# Patient Record
Sex: FEMALE | Race: WHITE | NOT HISPANIC OR LATINO | URBAN - METROPOLITAN AREA
[De-identification: names, ages, dates, MRNs, and addresses within clinical notes are randomized per-mention and may not be internally consistent; named-entity substitution may affect disease eponyms.]

---

## 2019-03-30 ENCOUNTER — EMERGENCY (EMERGENCY)
Facility: HOSPITAL | Age: 69
LOS: 1 days | Discharge: HOME | End: 2019-03-30
Attending: EMERGENCY MEDICINE
Payer: COMMERCIAL

## 2019-03-30 VITALS
RESPIRATION RATE: 18 BRPM | HEART RATE: 73 BPM | WEIGHT: 147.93 LBS | DIASTOLIC BLOOD PRESSURE: 97 MMHG | HEIGHT: 61 IN | TEMPERATURE: 99 F | SYSTOLIC BLOOD PRESSURE: 152 MMHG | OXYGEN SATURATION: 100 %

## 2019-03-30 VITALS
HEART RATE: 68 BPM | OXYGEN SATURATION: 99 % | SYSTOLIC BLOOD PRESSURE: 142 MMHG | TEMPERATURE: 99 F | RESPIRATION RATE: 18 BRPM | DIASTOLIC BLOOD PRESSURE: 81 MMHG

## 2019-03-30 PROCEDURE — 99283 EMERGENCY DEPT VISIT LOW MDM: CPT | Mod: 25

## 2019-03-30 PROCEDURE — 29125 APPL SHORT ARM SPLINT STATIC: CPT | Mod: LT

## 2019-03-30 PROCEDURE — 73630 X-RAY EXAM OF FOOT: CPT | Mod: 26,LT

## 2019-03-30 PROCEDURE — 99284 EMERGENCY DEPT VISIT MOD MDM: CPT | Mod: 25

## 2019-03-30 PROCEDURE — 73610 X-RAY EXAM OF ANKLE: CPT

## 2019-03-30 PROCEDURE — 73630 X-RAY EXAM OF FOOT: CPT

## 2019-03-30 PROCEDURE — 73610 X-RAY EXAM OF ANKLE: CPT | Mod: 26,LT

## 2019-03-30 PROCEDURE — 29515 APPLICATION SHORT LEG SPLINT: CPT

## 2019-03-30 RX ORDER — IBUPROFEN 200 MG
600 TABLET ORAL ONCE
Qty: 0 | Refills: 0 | Status: COMPLETED | OUTPATIENT
Start: 2019-03-30 | End: 2019-03-30

## 2019-03-30 RX ORDER — ACETAMINOPHEN 500 MG
975 TABLET ORAL ONCE
Qty: 0 | Refills: 0 | Status: COMPLETED | OUTPATIENT
Start: 2019-03-30 | End: 2019-03-30

## 2019-03-30 RX ORDER — TRAMADOL HYDROCHLORIDE 50 MG/1
50 TABLET ORAL ONCE
Qty: 0 | Refills: 0 | Status: DISCONTINUED | OUTPATIENT
Start: 2019-03-30 | End: 2019-03-30

## 2019-03-30 RX ADMIN — TRAMADOL HYDROCHLORIDE 50 MILLIGRAM(S): 50 TABLET ORAL at 21:42

## 2019-03-30 RX ADMIN — Medication 975 MILLIGRAM(S): at 21:41

## 2019-03-30 RX ADMIN — Medication 600 MILLIGRAM(S): at 21:42

## 2019-03-30 NOTE — ED ADULT NURSE NOTE - NSIMPLEMENTINTERV_GEN_ALL_ED
Implemented All Fall Risk Interventions:  [place] to call system. Call bell, personal items and telephone within reach. Instruct patient to call for assistance. Room bathroom lighting operational. Non-slip footwear when patient is off stretcher. Physically safe environment: no spills, clutter or unnecessary equipment. Stretcher in lowest position, wheels locked, appropriate side rails in place. Provide visual cue, wrist band, yellow gown, etc. Monitor gait and stability. Monitor for mental status changes and reorient to person, place, and time. Review medications for side effects contributing to fall risk. Reinforce activity limits and safety measures with patient and family.

## 2019-03-30 NOTE — ED PROVIDER NOTE - PROGRESS NOTE DETAILS
Plain films reviewed. Patient informed our interpretation is preliminary. Splint applied and crutches provided regardless. Discharged with appropriate instruction and follow-up.

## 2019-03-30 NOTE — ED ADULT TRIAGE NOTE - RESPIRATORY RATE (BREATHS/MIN)
Discharge Instructions    Discharged to home by taxi with self. Discharged via wheelchair, hospital escort: Yes.  Special equipment needed: Not Applicable    Be sure to schedule a follow-up appointment with your primary care doctor or any specialists as instructed.     Discharge Plan:   Diet Plan: Discussed  Activity Level: Discussed  Smoking Cessation Offered: Patient Counseled  Confirmed Follow up Appointment: Patient to Call and Schedule Appointment  Confirmed Symptoms Management: Discussed  Medication Reconciliation Updated: Yes  Pneumococcal Vaccine Administered/Refused: Given (See MAR)  Influenza Vaccine Indication: Indicated: 9 to 64 years of age  Influenza Vaccine Given - only chart on this line when given: Influenza Vaccine Given (See MAR)    I understand that a diet low in cholesterol, fat, and sodium is recommended for good health. Unless I have been given specific instructions below for another diet, I accept this instruction as my diet prescription.   Other diet: regular    Special Instructions: None    · Is patient discharged on Warfarin / Coumadin?   No     Depression / Suicide Risk    As you are discharged from this [facility], it is important to learn how to keep safe from harming yourself.    Recognize the warning signs:  · Abrupt changes in personality, positive or negative- including increase in energy   · Giving away possessions  · Change in eating patterns- significant weight changes-  positive or negative  · Change in sleeping patterns- unable to sleep or sleeping all the time   · Unwillingness or inability to communicate  · Depression  · Unusual sadness, discouragement and loneliness  · Talk of wanting to die  · Neglect of personal appearance   · Rebelliousness- reckless behavior  · Withdrawal from people/activities they love  · Confusion- inability to concentrate     If you or a loved one observes any of these behaviors or has concerns about self-harm, here's what you can  do:  · Talk about it- your feelings and reasons for harming yourself  · Remove any means that you might use to hurt yourself (examples: pills, rope, extension cords, firearm)  · Get professional help from the community (Mental Health, Substance Abuse, psychological counseling)  · Do not be alone:Call your Safe Contact- someone whom you trust who will be there for you.  · Call your local CRISIS HOTLINE 950-1411 or 066-680-4453  · Call your local Children's Mobile Crisis Response Team [place] (802) 866-3005 or www.People and Pages  · Call the toll free National Suicide Prevention Hotlines   · National Suicide Prevention Lifeline 305-537-CFRS (1484)  · National Hope Line Network 800-SUICIDE (528-4243)           18

## 2019-03-30 NOTE — ED PROVIDER NOTE - ATTENDING CONTRIBUTION TO CARE
67 y/o female with the above documented history and HPI who on exam appears well and comfortable. VSs noted, neck supple, breathing c/ ease, extremities c/ slight swelling and ttp on the dorsal-lateral  aspect of her L foot s/ ttp at the proximal L fibula, posterior to her L malleoli or over the L navicular, skin s/ rash and neurovascularly intact. Appropriate imaging ordered. Analgesia provided. Will follow her imaging and treat/dispo accordingly.

## 2019-03-30 NOTE — ED ADULT NURSE NOTE - OBJECTIVE STATEMENT
69 y/o female with PMH of HTN presents to the ED  from home c/o left foot injury. Patient states that she was walking down the stairs around 1500 this afternoon and had missed the last step and had landed on the outer side of her foot. patient states she has had pain and has not been able to walk. Denies fever, chills, n/v, weakness, abd pain, diarrhea/constipation, numbness/tingling, urinary s/s. Patient A&Ox3, in no respiratory distress, and denies chest pain. Patient has sensation in all 4 extremities. Strong peripheral pulses.

## 2019-03-30 NOTE — ED PROVIDER NOTE - PHYSICAL EXAMINATION
NAD, VSS, Afebrile, + PERRL. No facial or scalp tender. No spinal tender. NO rib or CVA tender. Lungs clear. ABD soft, non tender. No pelvic or hip tender. + Left ankle generalized tender, diminished ROMs, without obvious swelling. + Left foot mild swelling/ tender to metatarsal area. N/V- intact.

## 2019-03-30 NOTE — ED PROVIDER NOTE - CONSTITUTIONAL, MLM
normal... Well appearing, well nourished, awake, alert, oriented to person, place, time/situation and in no apparent distress. no

## 2019-03-30 NOTE — ED PROVIDER NOTE - OBJECTIVE STATEMENT
67yo female pt, no PMHx, c/o left foot/ ankle pain s/p mechanical fall this afternoon. Pt missed the last step when she came down stairs and fell hurting left ankle at 3pm. Denies LOC or other injuries. Pt stated the pain was mild initially and she was able to walk. The pain's worsen with severe pain after a long period of sitting. Now she is unable to weight bear on left foot. Denies sensory changes or weakness to extremities. Denies headache, dizziness or N/V. Denies neck or back pain. Denies CP/SOB/ABD pain. Denies fever, chills or recent sickness.

## 2019-03-31 RX ORDER — TRAMADOL HYDROCHLORIDE 50 MG/1
1 TABLET ORAL
Qty: 20 | Refills: 0
Start: 2019-03-31

## 2019-04-01 ENCOUNTER — APPOINTMENT (OUTPATIENT)
Dept: ORTHOPEDIC SURGERY | Facility: CLINIC | Age: 69
End: 2019-04-01
Payer: COMMERCIAL

## 2019-04-01 VITALS — BODY MASS INDEX: 28.13 KG/M2 | WEIGHT: 149 LBS | HEIGHT: 61 IN

## 2019-04-01 DIAGNOSIS — S93.402A SPRAIN OF UNSPECIFIED LIGAMENT OF LEFT ANKLE, INITIAL ENCOUNTER: ICD-10-CM

## 2019-04-01 PROCEDURE — 99203 OFFICE O/P NEW LOW 30 MIN: CPT

## 2019-04-01 NOTE — HISTORY OF PRESENT ILLNESS
[7] : an average pain level of 7/10 [de-identified] : Pt is a 69 y/o female c/o left ankle pain x 2 days.  She hurt her ankle when she tripped going down stairs.  She did not fall but states that she twisted the ankle. Patient was able to weight bear immediately after but states that the pain has worsened since. She had pain and swelling in the ankle.  She went [facility] ER that evening where xrays were taken and read negative for fx. She was placed in a posterior splint and a cast shoe.  She was told to follow up here.  The pain is improving. Patient localizes the pain about the ankle and over the lateral aspect of the foot dorsally. She was given something for pain but she cannot recall the name of the medication.  She is able to ambulate without assistance.

## 2019-04-01 NOTE — CONSULT LETTER
[Dear  ___] : Dear  [unfilled], [Consult Letter:] : I had the pleasure of evaluating your patient, [unfilled]. [Please see my note below.] : Please see my note below. [Sincerely,] : Sincerely, [FreeTextEntry2] : GHADA MEEKS [FreeTextEntry3] : Dr. Robin Paula

## 2019-04-01 NOTE — ADDENDUM
[FreeTextEntry1] : I, Jen Hu wrote this note acting as a scribe for Dr. Robin Paula on Apr 01, 2019.

## 2019-04-01 NOTE — DISCUSSION/SUMMARY
[de-identified] : The underlying pathophysiology was reviewed with the patient. Treatment options were discussed including; observation, NSAIDS, analgesics, bracing, and physical therapy. \par \par Patient may wear an ASO ankle brace. \par Walking and physical activity were encouraged. \par NSAIDs as tolerated. \par Follow up in 4 weeks for reevaluation.

## 2019-04-01 NOTE — END OF VISIT
[FreeTextEntry3] : I, Robin Paula MD, ordering physician, have read and attest that all the information, medical decision making and discharge instructions within are true and accurate.

## 2019-04-01 NOTE — PHYSICAL EXAM
[de-identified] : Patient is WDWN, alert, and in no acute distress. Breathing is unlabored. She is grossly oriented to person, place, and time. \par left foot\par \par LEFT Foot: There is tenderness to palpation dorsally over the left anterior talofibular ligament. Active and passive ROM of the ankle is full. There is pain with inversion. There is mild edema. Muscle strength is 5/5, NV intact. Skin is warm to touch. pulses palpated. The joint is stable with stress maneuvers. There is no pain with palpation over the calcaneus.  [de-identified] : Xrays of the left ankle were obtained from an outside medical facility and revealed no evidence of fracture or dislocation.

## 2019-10-23 ENCOUNTER — RESULT REVIEW (OUTPATIENT)
Age: 69
End: 2019-10-23

## 2020-07-02 ENCOUNTER — INPATIENT (INPATIENT)
Facility: HOSPITAL | Age: 70
LOS: 0 days | Discharge: LEFT BEFORE DISCHARGE | DRG: 312 | End: 2020-07-03
Attending: INTERNAL MEDICINE | Admitting: INTERNAL MEDICINE
Payer: MEDICARE

## 2020-07-02 VITALS
OXYGEN SATURATION: 100 % | WEIGHT: 149.03 LBS | HEART RATE: 71 BPM | SYSTOLIC BLOOD PRESSURE: 167 MMHG | HEIGHT: 61 IN | TEMPERATURE: 98 F | RESPIRATION RATE: 16 BRPM | DIASTOLIC BLOOD PRESSURE: 97 MMHG

## 2020-07-02 DIAGNOSIS — R07.9 CHEST PAIN, UNSPECIFIED: ICD-10-CM

## 2020-07-02 LAB
ALBUMIN SERPL ELPH-MCNC: 4.5 G/DL — SIGNIFICANT CHANGE UP (ref 3.3–5)
ALP SERPL-CCNC: 60 U/L — SIGNIFICANT CHANGE UP (ref 40–120)
ALT FLD-CCNC: 18 U/L — SIGNIFICANT CHANGE UP (ref 10–45)
ANION GAP SERPL CALC-SCNC: 12 MMOL/L — SIGNIFICANT CHANGE UP (ref 5–17)
AST SERPL-CCNC: 22 U/L — SIGNIFICANT CHANGE UP (ref 10–40)
BILIRUB SERPL-MCNC: 0.2 MG/DL — SIGNIFICANT CHANGE UP (ref 0.2–1.2)
BUN SERPL-MCNC: 13 MG/DL — SIGNIFICANT CHANGE UP (ref 7–23)
CALCIUM SERPL-MCNC: 9.8 MG/DL — SIGNIFICANT CHANGE UP (ref 8.4–10.5)
CHLORIDE SERPL-SCNC: 104 MMOL/L — SIGNIFICANT CHANGE UP (ref 96–108)
CO2 SERPL-SCNC: 27 MMOL/L — SIGNIFICANT CHANGE UP (ref 22–31)
CREAT SERPL-MCNC: 0.84 MG/DL — SIGNIFICANT CHANGE UP (ref 0.5–1.3)
GLUCOSE SERPL-MCNC: 99 MG/DL — SIGNIFICANT CHANGE UP (ref 70–99)
HCT VFR BLD CALC: 44.8 % — SIGNIFICANT CHANGE UP (ref 34.5–45)
HGB BLD-MCNC: 15 G/DL — SIGNIFICANT CHANGE UP (ref 11.5–15.5)
MCHC RBC-ENTMCNC: 29.1 PG — SIGNIFICANT CHANGE UP (ref 27–34)
MCHC RBC-ENTMCNC: 33.5 GM/DL — SIGNIFICANT CHANGE UP (ref 32–36)
MCV RBC AUTO: 87 FL — SIGNIFICANT CHANGE UP (ref 80–100)
NRBC # BLD: 0 /100 WBCS — SIGNIFICANT CHANGE UP (ref 0–0)
PLATELET # BLD AUTO: 220 K/UL — SIGNIFICANT CHANGE UP (ref 150–400)
POTASSIUM SERPL-MCNC: 4.5 MMOL/L — SIGNIFICANT CHANGE UP (ref 3.5–5.3)
POTASSIUM SERPL-SCNC: 4.5 MMOL/L — SIGNIFICANT CHANGE UP (ref 3.5–5.3)
PROT SERPL-MCNC: 7 G/DL — SIGNIFICANT CHANGE UP (ref 6–8.3)
RBC # BLD: 5.15 M/UL — SIGNIFICANT CHANGE UP (ref 3.8–5.2)
RBC # FLD: 12.9 % — SIGNIFICANT CHANGE UP (ref 10.3–14.5)
SODIUM SERPL-SCNC: 143 MMOL/L — SIGNIFICANT CHANGE UP (ref 135–145)
TROPONIN T, HIGH SENSITIVITY RESULT: <6 NG/L — SIGNIFICANT CHANGE UP (ref 0–51)
WBC # BLD: 6.05 K/UL — SIGNIFICANT CHANGE UP (ref 3.8–10.5)
WBC # FLD AUTO: 6.05 K/UL — SIGNIFICANT CHANGE UP (ref 3.8–10.5)

## 2020-07-02 PROCEDURE — 99223 1ST HOSP IP/OBS HIGH 75: CPT

## 2020-07-02 PROCEDURE — 70450 CT HEAD/BRAIN W/O DYE: CPT | Mod: 26

## 2020-07-02 PROCEDURE — 99285 EMERGENCY DEPT VISIT HI MDM: CPT | Mod: CS,GC

## 2020-07-02 PROCEDURE — 93010 ELECTROCARDIOGRAM REPORT: CPT | Mod: GC

## 2020-07-02 PROCEDURE — 71046 X-RAY EXAM CHEST 2 VIEWS: CPT | Mod: 26

## 2020-07-02 RX ORDER — ASPIRIN/CALCIUM CARB/MAGNESIUM 324 MG
324 TABLET ORAL ONCE
Refills: 0 | Status: COMPLETED | OUTPATIENT
Start: 2020-07-02 | End: 2020-07-02

## 2020-07-02 RX ADMIN — Medication 324 MILLIGRAM(S): at 22:39

## 2020-07-02 NOTE — H&P ADULT - PROBLEM SELECTOR PLAN 3
increase norvasc to 5mg daily  give stat hydralazine 10mg IV now increase norvasc to 5mg daily  BP very labile, ranging from -203; spontaneously decreased to .   will monitor closely for now

## 2020-07-02 NOTE — ED PROVIDER NOTE - ATTENDING CONTRIBUTION TO CARE
Private Physician Michelle Laureano [phone] PCP Deshaun Funetes  70y female pmh HTN, No dm,hld,cancer,cva,cad/mi,habits, Pt comes to ed complains of 3wk of "confusion in my head, with room spinning" worse with turning head. Pt had normal carotid doppler and schecuede mri brain. Dizziness had improved. Now with addition of shortness of breath and chest pressure, weakness. Pain/presssure comes and goes.  pusle 58-65 b/p 100 to 140 systolic. Pt made appointment to [facility]. PE WDWN female awake alert. PE WDWN female awake alert normocephalic atraumatic neck supple chest clear anterior & posterior abd soft +bs no mass guarding cv no rmg neruo no focal defect  Adams Menjivar MD, Facep

## 2020-07-02 NOTE — ED PROVIDER NOTE - NS_ ATTENDINGSCRIBEDETAILS _ED_A_ED_FT
History and Physical performed by me with scribe under my direct supervision.  ROSSY Menjivar MD FACEP

## 2020-07-02 NOTE — H&P ADULT - NSHPREVIEWOFSYSTEMS_GEN_ALL_CORE
CONSTITUTIONAL: No weakness, fevers or chills  EYES/ENT: No visual changes;  No dysphagia  NECK: No pain or stiffness  RESPIRATORY: +shortness of breath  CARDIOVASCULAR: chest pressure  EXTREMITIES: no le edema, cyanosis, clubbing  MUSCULOSKELETAL: no joint pain, swelling  GASTROINTESTINAL: No abdominal or epigastric pain. No nausea, vomiting, or hematemesis; No diarrhea or constipation. No melena or hematochezia.  BACK: +back pain radiating to left lower extremity  GENITOURINARY: No dysuria, frequency or hematuria  NEUROLOGICAL: +dizziness  SKIN: No itching, burning, rashes, or lesions   PSYCH: no agitation  All other review of systems is negative unless indicated above.

## 2020-07-02 NOTE — H&P ADULT - NSHPLABSRESULTS_GEN_ALL_CORE
Labs personally reviewed:                          15.0   6.05  )-----------( 220      ( 02 Jul 2020 23:03 )             44.8     07-02    143  |  104  |  13  ----------------------------<  99  4.5   |  27  |  0.84    Ca    9.8      02 Jul 2020 23:03    TPro  7.0  /  Alb  4.5  /  TBili  0.2  /  DBili  x   /  AST  22  /  ALT  18  /  AlkPhos  60  07-02        LIVER FUNCTIONS - ( 02 Jul 2020 23:03 )  Alb: 4.5 g/dL / Pro: 7.0 g/dL / ALK PHOS: 60 U/L / ALT: 18 U/L / AST: 22 U/L / GGT: x               CAPILLARY BLOOD GLUCOSE          Imaging:  CXR personally reviewed: no focal opacity  CT head reviewed:  There is no CT evidence of acute intracranial hemorrhage, extra-axial collection, vasogenic edema, mass effect, midline shift, central herniation, or hydrocephalus.   The visualized paranasal sinuses are clear. The mastoid air cells and middle ear cavities are clear.  The soft tissues of the scalp are unremarkable. The calvarium is intact.  IMPRESSION:   No CT evidence of acute intracranial hemorrhage, brain edema, mass effect or acute territorial infarct.     EKG personally reviewed: nsr, no acute st changes

## 2020-07-02 NOTE — H&P ADULT - NSHPPHYSICALEXAM_GEN_ALL_CORE
PHYSICAL EXAM:  Vital Signs Last 24 Hrs  T(C): 36.6 (07-03-20 @ 00:15)  T(F): 97.8 (07-03-20 @ 00:15), Max: 98.6 (07-02-20 @ 23:36)  HR: 67 (07-03-20 @ 00:15) (66 - 71)  BP: 165/92 (07-03-20 @ 00:15)  BP(mean): --  RR: 18 (07-03-20 @ 00:15) (16 - 22)  SpO2: 100% (07-03-20 @ 00:15) (99% - 100%)  Wt(kg): --    Constitutional: NAD, awake and alert  EYES: EOMI  ENT:  Normal Hearing, no tonsillar exudates   Neck: Soft and supple, No JVD  Lungs: Breath sounds are clear bilaterally, No wheezing, rales or rhonchi  Heart: S1 and S2, regular rate and rhythm, no Murmurs, gallops or rubs  Abdomen: Bowel Sounds present, soft, nontender, nondistended, no guarding, no rebound  Extremities: No cyanosis or clubbing; warm to touch  Vascular: 2+ peripheral pulses lower ex  Neurological: A/O x 3, no focal deficits; no nystagmus  Musculoskeletal: 5/5 strength b/l upper and lower extremities  Skin: No rashes  Psych: no depression or anhedonia  HEME: no bruises, no nose bleeds

## 2020-07-02 NOTE — H&P ADULT - HISTORY OF PRESENT ILLNESS
yo female with PMH of HTN, seasonal allergies, presents here with dizziness and SOB.  Patient states that about 3 weeks ago she started feeling "room spinning".  She denies any changes in vision or hearing, but she had tinnitus which was present for years apparently.  She says every time she moves her head, she feels room is spinning.  She spoke to her PCP who sent her for Carotid duplex that was negative.  She was ordered for MRI w/contrast which was not yet done.  Three days ago she woke up with SOB and feeling of heaviness in the chest.  Symptoms were mild, but constant.  She was scared to go for her regular morning walk because her symptoms were getting worse.  Today she work up with worsening of SOB and heaviness in the chest as well as "spinning" feeling.  She thought she was going to faint.  She checked her BP, which was very labile ranging from systolic of 140s-160s.  She denies any fever, chills, nausea, vomiting, cough, abdominal pain, GI/ symptoms. 69 yo female with PMH of HTN, seasonal allergies, presents here with dizziness and SOB.  Patient states that about 3 weeks ago she started feeling "room spinning".  She denies any changes in vision or hearing, but she had tinnitus which was present for years apparently.  She says every time she moves her head, she feels room is spinning.  She spoke to her PCP who sent her for Carotid duplex that was negative.  She was ordered for MRI w/contrast which was not yet done.  Three days ago she woke up with SOB and feeling of heaviness in the chest.  Symptoms were mild, but constant.  She was scared to go for her regular morning walk because her symptoms were getting worse.  Today she woke up with worsening of SOB and heaviness in the chest as well as "spinning" feeling.  Chest pain/heaviness does not change with breathing or movement.  Denies any associated diaphoresis.  She thought she was going to faint.  She checked her BP, which was very labile ranging from systolic of 140s-160s.  She denies any fever, chills, nausea, vomiting, cough, abdominal pain, GI/ symptoms. 71 yo female with PMH of HTN, seasonal allergies, presents here with dizziness and SOB.  Patient states that about 3 weeks ago she started feeling "room spinning".  She denies any changes in vision or hearing, but she had tinnitus which was present for years apparently.  She says every time she moves her head, she feels room is spinning.  She spoke to her PCP who sent her for Carotid duplex that was negative.  She was ordered for MRI w/contrast which was not yet done.  Three days ago she woke up with SOB and feeling of heaviness in the chest.  Symptoms were mild, but constant.  She was scared to go for her regular morning walk because her symptoms were getting worse.  Today she woke up with worsening of SOB and heaviness in the chest as well as "spinning" feeling.  Chest pain/heaviness does not change with breathing or movement.  Denies any associated diaphoresis.  She thought she was going to faint.  She checked her BP, which was very labile ranging from systolic of 140s-160s.  She denies any fever, chills, nausea, vomiting, cough, abdominal pain, GI/ symptoms.  In ED, patient continued to have dizziness.  SBP ranged from 150-203.

## 2020-07-02 NOTE — ED PROVIDER NOTE - OBJECTIVE STATEMENT
70y F w/ PMHx HTN p/w SOB, CP. Pt reports she first noticed sx occurring intermittently 3 weeks ago a/w dizziness. She visited her PMD and had carotid US with no noted abnormalities and planned for MRI. Today upon waking, CP was more severe, described as right sided pressure with SOB. She checked her blood pressure and noticed it was very labile despite taking HTN medication. She called her PMD who made an appointment with cardiology for Wednesday, but came to the ED today due to the persistent CP. Pt is currently without CP.

## 2020-07-02 NOTE — H&P ADULT - ATTENDING COMMENTS
Patient assigned to me by night hospitalist in charge for management and care for patient for this evening only. Care to be resumed by day hospitalist (Dr. Morales) in the morning and thereafter.     Patient's care rendered on 7/2/20 at 11PM.      Plan discussed with Patient, RN, daughter, [place].

## 2020-07-02 NOTE — H&P ADULT - PROBLEM SELECTOR PLAN 2
atypical chest pain/heaviness; r/o ACS  ?anxiety  EKG no ST changes  trend cardiac enzymes  monitor on telemetry  check echo  check TSH, A1C, Lipid panel, TSH

## 2020-07-02 NOTE — ED ADULT NURSE NOTE - OBJECTIVE STATEMENT
70F to ED c/o chest pressure and SOB for 3 weeks, worse today. Pt also c/o dizziness to the back of her head, as per patient. Pt has had negative carotid duplexes recently. Pt states recent SOB and chest heaviness and feeling like she might pass out. Pt has hx of HTN. Pt appears tachypnic at this time. Pt BS are CTA. Pt has 20 ga IV to RAC placed in ED. Pt is placed on cardiac monitor, NSR. Pt is alert and oriented x4, calm/cooperative, NAD, VSS. Pt speaks english and Hebrew. Pt is given the call bell, and verbalizes understanding of its use, and patient is given blanket for comfort.

## 2020-07-02 NOTE — H&P ADULT - PROBLEM SELECTOR PLAN 1
Patient with dizziness, feeling of faint, describing "room spinning", denies any hearing impairment, but has tinnitus; no fever, chills  likely vertigo, ?meniere's   r/o other causes such as ACS vs. PE vs. valvular disease vs. arrythmia vs. uncontrolled BP  will monitor on telemetry  CT head reviewed, no acute abnormalities  trend cardiac enzymes  check d-dimer  check echocardiogram  start trial of meclizine  need better control of BP, increase norvasc to 5mg for now  check TSH

## 2020-07-02 NOTE — ED PROVIDER NOTE - CLINICAL SUMMARY MEDICAL DECISION MAKING FREE TEXT BOX
Chucho Aguilera (MD): 70y F w/ hx of HTN p/w CP, SOB. ACS W/U. Will also obtain CT head for dizziness. If w/u normal, will have pt follow up with outpatient.

## 2020-07-03 ENCOUNTER — TRANSCRIPTION ENCOUNTER (OUTPATIENT)
Age: 70
End: 2020-07-03

## 2020-07-03 VITALS
OXYGEN SATURATION: 98 % | SYSTOLIC BLOOD PRESSURE: 149 MMHG | TEMPERATURE: 98 F | RESPIRATION RATE: 18 BRPM | HEART RATE: 87 BPM | DIASTOLIC BLOOD PRESSURE: 87 MMHG

## 2020-07-03 DIAGNOSIS — R07.89 OTHER CHEST PAIN: ICD-10-CM

## 2020-07-03 DIAGNOSIS — R09.89 OTHER SPECIFIED SYMPTOMS AND SIGNS INVOLVING THE CIRCULATORY AND RESPIRATORY SYSTEMS: ICD-10-CM

## 2020-07-03 DIAGNOSIS — I16.0 HYPERTENSIVE URGENCY: ICD-10-CM

## 2020-07-03 DIAGNOSIS — R42 DIZZINESS AND GIDDINESS: ICD-10-CM

## 2020-07-03 DIAGNOSIS — Z90.710 ACQUIRED ABSENCE OF BOTH CERVIX AND UTERUS: Chronic | ICD-10-CM

## 2020-07-03 DIAGNOSIS — Z29.9 ENCOUNTER FOR PROPHYLACTIC MEASURES, UNSPECIFIED: ICD-10-CM

## 2020-07-03 LAB
A1C WITH ESTIMATED AVERAGE GLUCOSE RESULT: 5.4 % — SIGNIFICANT CHANGE UP (ref 4–5.6)
ALBUMIN SERPL ELPH-MCNC: 4.3 G/DL — SIGNIFICANT CHANGE UP (ref 3.3–5)
ALP SERPL-CCNC: 60 U/L — SIGNIFICANT CHANGE UP (ref 40–120)
ALT FLD-CCNC: 17 U/L — SIGNIFICANT CHANGE UP (ref 10–45)
ANION GAP SERPL CALC-SCNC: 10 MMOL/L — SIGNIFICANT CHANGE UP (ref 5–17)
AST SERPL-CCNC: 21 U/L — SIGNIFICANT CHANGE UP (ref 10–40)
BASOPHILS # BLD AUTO: 0.01 K/UL — SIGNIFICANT CHANGE UP (ref 0–0.2)
BASOPHILS NFR BLD AUTO: 0.2 % — SIGNIFICANT CHANGE UP (ref 0–2)
BILIRUB SERPL-MCNC: 0.4 MG/DL — SIGNIFICANT CHANGE UP (ref 0.2–1.2)
BUN SERPL-MCNC: 11 MG/DL — SIGNIFICANT CHANGE UP (ref 7–23)
CALCIUM SERPL-MCNC: 9.6 MG/DL — SIGNIFICANT CHANGE UP (ref 8.4–10.5)
CHLORIDE SERPL-SCNC: 105 MMOL/L — SIGNIFICANT CHANGE UP (ref 96–108)
CHOLEST SERPL-MCNC: 220 MG/DL — HIGH (ref 10–199)
CK MB BLD-MCNC: 1.6 % — SIGNIFICANT CHANGE UP (ref 0–3.5)
CK MB CFR SERPL CALC: 1.7 NG/ML — SIGNIFICANT CHANGE UP (ref 0–3.8)
CK SERPL-CCNC: 106 U/L — SIGNIFICANT CHANGE UP (ref 25–170)
CO2 SERPL-SCNC: 27 MMOL/L — SIGNIFICANT CHANGE UP (ref 22–31)
CREAT SERPL-MCNC: 0.78 MG/DL — SIGNIFICANT CHANGE UP (ref 0.5–1.3)
D DIMER BLD IA.RAPID-MCNC: <150 NG/ML DDU — SIGNIFICANT CHANGE UP
EOSINOPHIL # BLD AUTO: 0.08 K/UL — SIGNIFICANT CHANGE UP (ref 0–0.5)
EOSINOPHIL NFR BLD AUTO: 1.5 % — SIGNIFICANT CHANGE UP (ref 0–6)
ESTIMATED AVERAGE GLUCOSE: 108 MG/DL — SIGNIFICANT CHANGE UP (ref 68–114)
GLUCOSE SERPL-MCNC: 101 MG/DL — HIGH (ref 70–99)
HCT VFR BLD CALC: 44.5 % — SIGNIFICANT CHANGE UP (ref 34.5–45)
HCV AB S/CO SERPL IA: 0.09 S/CO — SIGNIFICANT CHANGE UP (ref 0–0.99)
HCV AB SERPL-IMP: SIGNIFICANT CHANGE UP
HDLC SERPL-MCNC: 74 MG/DL — SIGNIFICANT CHANGE UP
HGB BLD-MCNC: 15.3 G/DL — SIGNIFICANT CHANGE UP (ref 11.5–15.5)
IMM GRANULOCYTES NFR BLD AUTO: 0.4 % — SIGNIFICANT CHANGE UP (ref 0–1.5)
LIPID PNL WITH DIRECT LDL SERPL: 125 MG/DL — HIGH
LYMPHOCYTES # BLD AUTO: 1.61 K/UL — SIGNIFICANT CHANGE UP (ref 1–3.3)
LYMPHOCYTES # BLD AUTO: 30.6 % — SIGNIFICANT CHANGE UP (ref 13–44)
MAGNESIUM SERPL-MCNC: 2.1 MG/DL — SIGNIFICANT CHANGE UP (ref 1.6–2.6)
MCHC RBC-ENTMCNC: 29.7 PG — SIGNIFICANT CHANGE UP (ref 27–34)
MCHC RBC-ENTMCNC: 34.4 GM/DL — SIGNIFICANT CHANGE UP (ref 32–36)
MCV RBC AUTO: 86.4 FL — SIGNIFICANT CHANGE UP (ref 80–100)
MONOCYTES # BLD AUTO: 0.56 K/UL — SIGNIFICANT CHANGE UP (ref 0–0.9)
MONOCYTES NFR BLD AUTO: 10.6 % — SIGNIFICANT CHANGE UP (ref 2–14)
NEUTROPHILS # BLD AUTO: 2.98 K/UL — SIGNIFICANT CHANGE UP (ref 1.8–7.4)
NEUTROPHILS NFR BLD AUTO: 56.7 % — SIGNIFICANT CHANGE UP (ref 43–77)
NRBC # BLD: 0 /100 WBCS — SIGNIFICANT CHANGE UP (ref 0–0)
PHOSPHATE SERPL-MCNC: 3.8 MG/DL — SIGNIFICANT CHANGE UP (ref 2.5–4.5)
PLATELET # BLD AUTO: 201 K/UL — SIGNIFICANT CHANGE UP (ref 150–400)
POTASSIUM SERPL-MCNC: 4.1 MMOL/L — SIGNIFICANT CHANGE UP (ref 3.5–5.3)
POTASSIUM SERPL-SCNC: 4.1 MMOL/L — SIGNIFICANT CHANGE UP (ref 3.5–5.3)
PROT SERPL-MCNC: 6.7 G/DL — SIGNIFICANT CHANGE UP (ref 6–8.3)
RBC # BLD: 5.15 M/UL — SIGNIFICANT CHANGE UP (ref 3.8–5.2)
RBC # FLD: 13 % — SIGNIFICANT CHANGE UP (ref 10.3–14.5)
SARS-COV-2 IGG SERPL QL IA: NEGATIVE — SIGNIFICANT CHANGE UP
SARS-COV-2 IGM SERPL IA-ACNC: <3.8 AU/ML — SIGNIFICANT CHANGE UP
SARS-COV-2 RNA SPEC QL NAA+PROBE: SIGNIFICANT CHANGE UP
SODIUM SERPL-SCNC: 142 MMOL/L — SIGNIFICANT CHANGE UP (ref 135–145)
TOTAL CHOLESTEROL/HDL RATIO MEASUREMENT: 3 RATIO — LOW (ref 3.3–7.1)
TRIGL SERPL-MCNC: 106 MG/DL — SIGNIFICANT CHANGE UP (ref 10–149)
TROPONIN T, HIGH SENSITIVITY RESULT: 6 NG/L — SIGNIFICANT CHANGE UP (ref 0–51)
TSH SERPL-MCNC: 4.35 UIU/ML — HIGH (ref 0.27–4.2)
WBC # BLD: 5.26 K/UL — SIGNIFICANT CHANGE UP (ref 3.8–10.5)
WBC # FLD AUTO: 5.26 K/UL — SIGNIFICANT CHANGE UP (ref 3.8–10.5)

## 2020-07-03 PROCEDURE — 99232 SBSQ HOSP IP/OBS MODERATE 35: CPT

## 2020-07-03 PROCEDURE — 82550 ASSAY OF CK (CPK): CPT

## 2020-07-03 PROCEDURE — 70450 CT HEAD/BRAIN W/O DYE: CPT

## 2020-07-03 PROCEDURE — 71046 X-RAY EXAM CHEST 2 VIEWS: CPT

## 2020-07-03 PROCEDURE — 84443 ASSAY THYROID STIM HORMONE: CPT

## 2020-07-03 PROCEDURE — 80053 COMPREHEN METABOLIC PANEL: CPT

## 2020-07-03 PROCEDURE — 85379 FIBRIN DEGRADATION QUANT: CPT

## 2020-07-03 PROCEDURE — 84100 ASSAY OF PHOSPHORUS: CPT

## 2020-07-03 PROCEDURE — 86803 HEPATITIS C AB TEST: CPT

## 2020-07-03 PROCEDURE — 93005 ELECTROCARDIOGRAM TRACING: CPT

## 2020-07-03 PROCEDURE — 83735 ASSAY OF MAGNESIUM: CPT

## 2020-07-03 PROCEDURE — 86769 SARS-COV-2 COVID-19 ANTIBODY: CPT

## 2020-07-03 PROCEDURE — 93306 TTE W/DOPPLER COMPLETE: CPT | Mod: 26

## 2020-07-03 PROCEDURE — 80061 LIPID PANEL: CPT

## 2020-07-03 PROCEDURE — 83036 HEMOGLOBIN GLYCOSYLATED A1C: CPT

## 2020-07-03 PROCEDURE — 93306 TTE W/DOPPLER COMPLETE: CPT

## 2020-07-03 PROCEDURE — 84484 ASSAY OF TROPONIN QUANT: CPT

## 2020-07-03 PROCEDURE — 82553 CREATINE MB FRACTION: CPT

## 2020-07-03 PROCEDURE — 85027 COMPLETE CBC AUTOMATED: CPT

## 2020-07-03 PROCEDURE — 99285 EMERGENCY DEPT VISIT HI MDM: CPT | Mod: 25

## 2020-07-03 RX ORDER — CHOLECALCIFEROL (VITAMIN D3) 125 MCG
2000 CAPSULE ORAL DAILY
Refills: 0 | Status: DISCONTINUED | OUTPATIENT
Start: 2020-07-03 | End: 2020-07-03

## 2020-07-03 RX ORDER — AMLODIPINE BESYLATE 2.5 MG/1
1 TABLET ORAL
Qty: 0 | Refills: 0 | DISCHARGE

## 2020-07-03 RX ORDER — MECLIZINE HCL 12.5 MG
1 TABLET ORAL
Qty: 42 | Refills: 0
Start: 2020-07-03 | End: 2020-07-16

## 2020-07-03 RX ORDER — SODIUM CHLORIDE 9 MG/ML
1000 INJECTION INTRAMUSCULAR; INTRAVENOUS; SUBCUTANEOUS
Refills: 0 | Status: DISCONTINUED | OUTPATIENT
Start: 2020-07-03 | End: 2020-07-03

## 2020-07-03 RX ORDER — CHOLECALCIFEROL (VITAMIN D3) 125 MCG
1 CAPSULE ORAL
Qty: 0 | Refills: 0 | DISCHARGE

## 2020-07-03 RX ORDER — AMLODIPINE BESYLATE 2.5 MG/1
5 TABLET ORAL DAILY
Refills: 0 | Status: DISCONTINUED | OUTPATIENT
Start: 2020-07-03 | End: 2020-07-03

## 2020-07-03 RX ORDER — HEPARIN SODIUM 5000 [USP'U]/ML
5000 INJECTION INTRAVENOUS; SUBCUTANEOUS EVERY 12 HOURS
Refills: 0 | Status: DISCONTINUED | OUTPATIENT
Start: 2020-07-03 | End: 2020-07-03

## 2020-07-03 RX ORDER — MECLIZINE HCL 12.5 MG
25 TABLET ORAL EVERY 8 HOURS
Refills: 0 | Status: DISCONTINUED | OUTPATIENT
Start: 2020-07-03 | End: 2020-07-03

## 2020-07-03 RX ADMIN — SODIUM CHLORIDE 70 MILLILITER(S): 9 INJECTION INTRAMUSCULAR; INTRAVENOUS; SUBCUTANEOUS at 10:06

## 2020-07-03 RX ADMIN — Medication 2000 UNIT(S): at 08:58

## 2020-07-03 RX ADMIN — AMLODIPINE BESYLATE 5 MILLIGRAM(S): 2.5 TABLET ORAL at 06:03

## 2020-07-03 NOTE — DISCHARGE NOTE NURSING/CASE MANAGEMENT/SOCIAL WORK - PATIENT PORTAL LINK FT
You can access the FollowMyHealth Patient Portal offered by [facility] by registering at the following website: [email or web address]. By joining [facility]’s FollowMyHealth portal, you will also be able to view your health information using other applications (apps) compatible with our system.

## 2020-07-03 NOTE — DISCHARGE NOTE PROVIDER - HOSPITAL COURSE
69 yo female with PMH of HTN, seasonal allergies, presents here with dizziness and SOB.  Patient states that about 3 weeks ago she started feeling "room spinning".  She denies any changes in vision or hearing, but she had tinnitus which was present for years apparently.  She says every time she moves her head, she feels room is spinning.  She spoke to her PCP who sent her for Carotid duplex that was negative.  She was ordered for MRI w/ contrast which was not yet done. Three days ago she woke up with SOB and feeling of heaviness in the chest.  Symptoms were mild, but constant.  She was scared to go for her regular morning walk because her symptoms were getting worse. Today she woke up with worsening of SOB and heaviness in the chest as well as "spinning" feeling.  Chest pain/heaviness does not change with breathing or movement.  Denies any associated diaphoresis.  She thought she was going to faint.  She checked her BP, which was very labile ranging from systolic of 140s-160s.  She denies any fever, chills, nausea, vomiting, cough, abdominal pain, GI/ symptoms.  In ED, patient continued to have dizziness, started on meclizine. During hospital course also found to have orthostatic hypotension, started on IV fluids.        Patient is now leaving the hospital against medical advice (AMA). After thorough discussion regarding risks of leaving AMA including bodily harm and even death, patient has verbalized understanding and leaving now without completing the recommended diagnostic/treatment regimen. 69 yo female PMH of HTN, seasonal allergies, presents here with dizziness and SOB.  Patient states that about 3 weeks ago she started feeling "room spinning".  She denies any changes in vision or hearing, but she had tinnitus which was present for years apparently.  She says every time she moves her head, she feels room is spinning.  She spoke to her PCP who sent her for Carotid duplex that was negative.  She was ordered for MRI w/ contrast which was not yet done. Three days ago she woke up with SOB and feeling of heaviness in the chest.  Symptoms were mild, but constant.  She was scared to go for her regular morning walk because her symptoms were getting worse. Today she woke up with worsening of SOB and heaviness in the chest as well as "spinning" feeling.  Chest pain/heaviness does not change with breathing or movement.  Denies any associated diaphoresis.  She thought she was going to faint.  She checked her BP, which was very labile ranging from systolic of 140s-160s.  In ED, patient continued to have dizziness, started on meclizine. During hospital course also found to have orthostatic hypotension, started on IV fluids.        Labs basically all normal. A1C 5.4, CBC and SMA-7 all normal, CT head normal, TTE bascially normal EF, see copy of report. COVID-19 nasal swab was negative.    Troponins all normal.           Patient is now leaving the hospital against medical advice (AMA). After thorough discussion regarding risks of leaving AMA including bodily harm and even death, patient has verbalized understanding and leaving now without completing the recommended diagnostic/treatment regimen. See attached med list. 69 yo female PMH of HTN, seasonal allergies, presents here with dizziness and SOB.  Patient states that about 3 weeks ago she started feeling "room spinning".  She denies any changes in vision or hearing, but she had tinnitus which was present for years apparently.  She says every time she moves her head, she feels room is spinning.  She spoke to her PCP who sent her for Carotid duplex that was negative.  She was ordered for MRI w/ contrast which was not yet done. Three days ago she woke up with SOB and feeling of heaviness in the chest.  Symptoms were mild, but constant.  She was scared to go for her regular morning walk because her symptoms were getting worse. Today she woke up with worsening of SOB and heaviness in the chest as well as "spinning" feeling.  Chest pain/heaviness does not change with breathing or movement.  Denies any associated diaphoresis.  She thought she was going to faint.  She checked her BP, which was very labile ranging from systolic of 140s-160s.  In ED, patient continued to have dizziness, started on meclizine. During hospital course also found to have orthostatic hypotension, started on IV fluids.        Labs basically all normal. A1C 5.4, CBC and SMA-7 all normal, CT head normal, TTE bascially normal EF, see copy of report. COVID-19 nasal swab was negative.    Troponins all normal.  EKG is NSR, no ST changes.          Patient is now leaving the hospital against medical advice (AMA). After thorough discussion regarding risks of leaving AMA including bodily harm and even death, patient has verbalized understanding and leaving now without completing the recommended diagnostic/treatment regimen. See attached med list.

## 2020-07-03 NOTE — PROGRESS NOTE ADULT - ASSESSMENT
71 yo female   with PMH of HTN, seasonal allergies,   presents here with dizziness and SOB.     tele,  nsr  normal  d  dimer  dizziness/  Ct head,  normal  s/p  cp/ card eval  HTn , on norvasc   e cho 69 yo female   with PMH of HTN, seasonal allergies,   presents here with dizziness   and SOB./  s/p  cp ,  for  past  2 years  per  pt    tele,  nsr  normal  d  dimer  dizziness/  Ct head,  normal  s/p  cp/ card eval  HTn , on norvasc  5mg. qd   pt is  orthostatic/  with  drop  of  20  mm hg  on iv  fluids/    dizziness  is  probably  from  dehydration/ orthostasis  will  not  increase  norvasc   echo,  pending

## 2020-07-03 NOTE — PROGRESS NOTE ADULT - SUBJECTIVE AND OBJECTIVE BOX
no  cp/spb    REVIEW OF SYSTEMS:  GEN: no fever,    no chills  RESP: no SOB,   no cough  CVS: no chest pain,   no palpitations  GI: no abdominal pain,   no nausea,   no vomiting,   no constipation,   no diarrhea  : no dysuria,   no frequency  NEURO: no headache,   no dizziness  PSYCH: no depression,   not anxious  Derm : no rash    MEDICATIONS  (STANDING):  amLODIPine   Tablet 5 milliGRAM(s) Oral daily  cholecalciferol 2000 Unit(s) Oral daily    MEDICATIONS  (PRN):  meclizine 25 milliGRAM(s) Oral every 8 hours PRN Dizziness      Vital Signs Last 24 Hrs  T(C): 36.6 (03 Jul 2020 04:30), Max: 37 (02 Jul 2020 23:36)  T(F): 97.9 (03 Jul 2020 04:30), Max: 98.6 (02 Jul 2020 23:36)  HR: 65 (03 Jul 2020 04:30) (65 - 71)  BP: 144/76 (03 Jul 2020 04:30) (144/76 - 167/97)  BP(mean): --  RR: 18 (03 Jul 2020 04:30) (16 - 22)  SpO2: 98% (03 Jul 2020 04:30) (98% - 100%)  CAPILLARY BLOOD GLUCOSE        I&O's Summary      PHYSICAL EXAM:  HEAD:  Atraumatic, Normocephalic  NECK: Supple, No   JVD  CHEST/LUNG:   no     rales,     no,    rhonchi  HEART: Regular rate and rhythm;         murmur  ABDOMEN: Soft, Nontender, ;   EXTREMITIES:     no   edema  NEUROLOGY:  alert    LABS:                        15.3   5.26  )-----------( 201      ( 03 Jul 2020 06:19 )             44.5     07-03    142  |  105  |  11  ----------------------------<  101<H>  4.1   |  27  |  0.78    Ca    9.6      03 Jul 2020 06:19  Phos  3.8     07-03  Mg     2.1     07-03    TPro  6.7  /  Alb  4.3  /  TBili  0.4  /  DBili  x   /  AST  21  /  ALT  17  /  AlkPhos  60  07-03      CARDIAC MARKERS ( 03 Jul 2020 01:04 )  x     / x     / 106 U/L / x     / 1.7 ng/mL                        Consultant(s) Notes Reviewed:      Care Discussed with Consultants/Other Providers:

## 2020-07-03 NOTE — PROVIDER CONTACT NOTE (OTHER) - SITUATION
Laying 156/93, HR 82 - patient states feeling "dizzy"  Sitting 134/87, HR 85 - patient states feeling "very dizzy"  Standing 134/96,  -patient states feeling SOB and c/o chest pressure  O2 98%
Patient c/o 4/10 chest pressure and dizziness on ambulation

## 2020-07-03 NOTE — CONSULT NOTE ADULT - ATTENDING COMMENTS
Patient seen and examined.  Agree with above NP note.  patient with vertigo symptoms, recent chest pain, now with dyspnea   +orthostatics  seems like BPV with imposed orthostatic hypotension  cont meclizine  hydrate  await echo   rec nuclear stress testing with new onset dyspnea, chest pain  she does not want to stay for inpt workup and wants to leave

## 2020-07-03 NOTE — DISCHARGE NOTE PROVIDER - NSDCCPCAREPLAN_GEN_ALL_CORE_FT
PRINCIPAL DISCHARGE DIAGNOSIS  Diagnosis: Dizziness  Assessment and Plan of Treatment: Found to have orthostatic hypotension, started on IV fluids.  CV stable, EKG with no evidence of acute ischemia or arrythmia.  CE neg x 2, tele without events  Continue meclizine as needed for vertigo   CT head - no acute abnormalities  check echo to eval LVEF, valve function  Follow up with your PCP upon discharge.        SECONDARY DISCHARGE DIAGNOSES  Diagnosis: SOB (shortness of breath)  Assessment and Plan of Treatment: Follow up with your Cardiologist and PCP upon discharge.

## 2020-07-03 NOTE — CONSULT NOTE ADULT - SUBJECTIVE AND OBJECTIVE BOX
CARDIOLOGY CONSULT - Dr. Atkinson     CHIEF COMPLAINT: sob, dizziness     HPI:  69 yo female with PMH of HTN, seasonal allergies, presents here with dizziness and SOB.  Patient states that about 3 weeks ago she started feeling "room spinning".  She denies any changes in vision or hearing, but she had tinnitus which was present for years apparently.  She says every time she moves her head, she feels room is spinning.  She spoke to her PCP who sent her for Carotid duplex that was negative.  She was ordered for MRI w/ contrast which was not yet done. Three days ago she woke up with SOB and feeling of heaviness in the chest.  Symptoms were mild, but constant.  She was scared to go for her regular morning walk because her symptoms were getting worse. Today she woke up with worsening of SOB and heaviness in the chest as well as "spinning" feeling.  Chest pain/heaviness does not change with breathing or movement.  Denies any associated diaphoresis.  She thought she was going to faint.  She checked her BP, which was very labile ranging from systolic of 140s-160s.  She denies any fever, chills, nausea, vomiting, cough, abdominal pain, GI/ symptoms.  In ED, patient continued to have dizziness.  SBP ranged from 150-203.       PAST MEDICAL & SURGICAL HISTORY:  Seasonal allergies  HTN (hypertension)  S/P hysterectomy          PREVIOUS DIAGNOSTIC TESTING:    [ ] Echocardiogram:   [ ]  Catheterization:   [ ] Stress Test:  	    MEDICATIONS:  MEDICATIONS  (STANDING):  amLODIPine   Tablet 5 milliGRAM(s) Oral daily  cholecalciferol 2000 Unit(s) Oral daily  heparin   Injectable 5000 Unit(s) SubCutaneous every 12 hours      FAMILY HISTORY:  No pertinent family history      SOCIAL HISTORY:    [ x] Non-smoker  [ ] Smoker  [ ] Alcohol    Allergies    No Known Allergies    Intolerances    	    REVIEW OF SYSTEMS:  CONSTITUTIONAL: No fever, weight loss, or fatigue  EYES: No eye pain, visual disturbances, or discharge  ENMT:  No difficulty hearing, tinnitus, vertigo; No sinus or throat pain  NECK: No pain or stiffness  RESPIRATORY: No cough, wheezing, chills or hemoptysis; No Shortness of Breath  CARDIOVASCULAR: No chest pain, palpitations, passing out, +dizziness, or leg swelling  GASTROINTESTINAL: No abdominal or epigastric pain. No nausea, vomiting, or hematemesis; No diarrhea or constipation. No melena or hematochezia.  GENITOURINARY: No dysuria, frequency, hematuria, or incontinence  NEUROLOGICAL: No headaches, memory loss, loss of strength, numbness, or tremors  SKIN: No itching, burning, rashes, or lesions   	    [ x] All others negative	  [ ] Unable to obtain    PHYSICAL EXAM:  T(C): 36.6 (07-03-20 @ 04:30), Max: 37 (07-02-20 @ 23:36)  HR: 65 (07-03-20 @ 04:30) (65 - 71)  BP: 144/76 (07-03-20 @ 04:30) (144/76 - 167/97)  RR: 18 (07-03-20 @ 04:30) (16 - 22)  SpO2: 98% (07-03-20 @ 04:30) (98% - 100%)  Wt(kg): --  I&O's Summary      Appearance: Normal	  Psychiatry: A & O x 3, Mood & affect appropriate  HEENT:   Normal oral mucosa, PERRL, EOMI	  Lymphatic: No lymphadenopathy  Cardiovascular: Normal S1 S2,RRR, No JVD, No murmurs  Respiratory: Lungs clear to auscultation	  Gastrointestinal:  Soft, Non-tender, + BS	  Skin: No rashes, No ecchymoses, No cyanosis	  Neurologic: Non-focal  Extremities: Normal range of motion, No clubbing, cyanosis or edema  Vascular: Peripheral pulses palpable 2+ bilaterally    TELEMETRY:  nsr	    ECG:  NSR 73  RADIOLOGY: < from: Xray Chest 2 Views PA/Lat (07.02.20 @ 22:33) >  IMPRESSION:    Clear lungs.    < end of copied text >    OTHER: 	  	  LABS:	 	    CARDIAC MARKERS:                                  15.3   5.26  )-----------( 201      ( 03 Jul 2020 06:19 )             44.5     07-03    142  |  105  |  11  ----------------------------<  101<H>  4.1   |  27  |  0.78    Ca    9.6      03 Jul 2020 06:19  Phos  3.8     07-03  Mg     2.1     07-03    TPro  6.7  /  Alb  4.3  /  TBili  0.4  /  DBili  x   /  AST  21  /  ALT  17  /  AlkPhos  60  07-03      proBNP:   Lipid Profile:   HgA1c:   TSH: Thyroid Stimulating Hormone, Serum: 4.35 uIU/mL (07-03 @ 05:50)

## 2020-07-03 NOTE — CONSULT NOTE ADULT - ASSESSMENT
71 yo female with PMH of HTN, seasonal allergies, presents here with dizziness and SOB.    1. Dizziness  cv stable, EKG with no evidence of acute ischemia or arrythmia  CE neg x 2, tele without events  cont meclizine for vertigo   CT head - no acute abnormalities  check echo to eval LVEF, valve function    2. SOB, atypical chest pain  ? 2/2 to anxiety  cv stable , no evidence of acute ischemia/ACS   cxr clear, no evidence of ADHF on exam  check echo to eval lvef, valve function     3. HTN  bp labile  increase norvasc to 10mg daily     dvt ppx 71 yo female with PMH of HTN, seasonal allergies, presents here with dizziness and SOB.    1. Dizziness  cv stable, EKG with no evidence of acute ischemia or arrythmia  CE neg x 2, tele without events  cont meclizine for vertigo   CT head - no acute abnormalities  check echo to eval LVEF, valve function  orthostatics borderline +  hydrate    2. SOB, atypical chest pain  ? 2/2 to anxiety  cv stable , no evidence of acute ischemia/ACS   cxr clear, no evidence of ADHF on exam  check echo to eval lvef, valve function     3. HTN  bp labile  can increase norvasc to 10mg daily if having sx with sbp > 160     dvt ppx

## 2020-07-03 NOTE — PROVIDER CONTACT NOTE (OTHER) - ASSESSMENT
Laying 156/93, HR 82 - patient states feeling "dizzy"  Sitting 134/87, HR 85 - patient states feeling "very dizzy"  Standing 134/96,  -patient states feeling SOB and c/o chest pressure  O2 98%

## 2020-07-03 NOTE — DISCHARGE NOTE PROVIDER - NSDCMRMEDTOKEN_GEN_ALL_CORE_FT
amLODIPine 2.5 mg oral tablet: 1 tab(s) orally once a day  meclizine 25 mg oral tablet: 1 tab(s) orally every 8 hours, As needed, Dizziness  Vitamin D3 2000 intl units (50 mcg) oral tablet: 1 tab(s) orally once a day

## 2020-07-03 NOTE — DISCHARGE NOTE PROVIDER - NSDCFUSCHEDAPPT_GEN_ALL_CORE_FT
CLIFTON QUINTANILLA ; 07/08/2020 ; NPP Card[facility] [address] [facility] CLIFTON QUINTANILLA ; 07/08/2020 ; NPP Card[facility] [address] [facility] CLIFTON QUINTANILLA ; 07/08/2020 ; NPP Cardio [address] [facility]

## 2020-07-07 ENCOUNTER — TRANSCRIPTION ENCOUNTER (OUTPATIENT)
Age: 70
End: 2020-07-07

## 2020-07-08 ENCOUNTER — APPOINTMENT (OUTPATIENT)
Dept: DISASTER EMERGENCY | Facility: CLINIC | Age: 70
End: 2020-07-08

## 2020-07-08 ENCOUNTER — APPOINTMENT (OUTPATIENT)
Dept: CARDIOLOGY | Facility: CLINIC | Age: 70
End: 2020-07-08
Payer: MEDICARE

## 2020-07-08 ENCOUNTER — NON-APPOINTMENT (OUTPATIENT)
Age: 70
End: 2020-07-08

## 2020-07-08 VITALS
WEIGHT: 149 LBS | DIASTOLIC BLOOD PRESSURE: 86 MMHG | BODY MASS INDEX: 28.13 KG/M2 | RESPIRATION RATE: 15 BRPM | SYSTOLIC BLOOD PRESSURE: 156 MMHG | HEIGHT: 61 IN | HEART RATE: 80 BPM

## 2020-07-08 DIAGNOSIS — Z01.818 ENCOUNTER FOR OTHER PREPROCEDURAL EXAMINATION: ICD-10-CM

## 2020-07-08 PROCEDURE — 99204 OFFICE O/P NEW MOD 45 MIN: CPT | Mod: 25

## 2020-07-08 PROCEDURE — ZZZZZ: CPT

## 2020-07-08 PROCEDURE — 93015 CV STRESS TEST SUPVJ I&R: CPT

## 2020-07-08 RX ORDER — DOXYCYCLINE HYCLATE 100 MG/1
100 CAPSULE ORAL
Qty: 20 | Refills: 0 | Status: COMPLETED | COMMUNITY
Start: 2018-12-05 | End: 2020-07-08

## 2020-07-08 RX ORDER — AMLODIPINE BESYLATE 5 MG/1
5 TABLET ORAL
Qty: 90 | Refills: 0 | Status: COMPLETED | COMMUNITY
Start: 2018-12-05 | End: 2020-07-08

## 2020-07-08 RX ORDER — TERBINAFINE HYDROCHLORIDE 250 MG/1
250 TABLET ORAL
Qty: 30 | Refills: 0 | Status: COMPLETED | COMMUNITY
Start: 2018-11-26 | End: 2020-07-08

## 2020-07-08 RX ORDER — TRAMADOL HYDROCHLORIDE 50 MG/1
50 TABLET, COATED ORAL
Qty: 20 | Refills: 0 | Status: COMPLETED | COMMUNITY
Start: 2019-03-31 | End: 2020-07-08

## 2020-07-08 RX ORDER — ALBUTEROL SULFATE 90 UG/1
108 (90 BASE) AEROSOL, METERED RESPIRATORY (INHALATION)
Qty: 8 | Refills: 0 | Status: COMPLETED | COMMUNITY
Start: 2018-12-05 | End: 2020-07-08

## 2020-07-10 ENCOUNTER — OUTPATIENT (OUTPATIENT)
Dept: OUTPATIENT SERVICES | Facility: HOSPITAL | Age: 70
LOS: 1 days | End: 2020-07-10
Payer: MEDICARE

## 2020-07-10 DIAGNOSIS — Z90.710 ACQUIRED ABSENCE OF BOTH CERVIX AND UTERUS: Chronic | ICD-10-CM

## 2020-07-10 DIAGNOSIS — R07.9 CHEST PAIN, UNSPECIFIED: ICD-10-CM

## 2020-07-10 LAB
ALBUMIN SERPL ELPH-MCNC: 4.6 G/DL — SIGNIFICANT CHANGE UP (ref 3.3–5)
ALP SERPL-CCNC: 64 U/L — SIGNIFICANT CHANGE UP (ref 40–120)
ALT FLD-CCNC: 16 U/L — SIGNIFICANT CHANGE UP (ref 10–45)
ANION GAP SERPL CALC-SCNC: 12 MMOL/L — SIGNIFICANT CHANGE UP (ref 5–17)
AST SERPL-CCNC: 22 U/L — SIGNIFICANT CHANGE UP (ref 10–40)
BILIRUB SERPL-MCNC: 0.2 MG/DL — SIGNIFICANT CHANGE UP (ref 0.2–1.2)
BUN SERPL-MCNC: 18 MG/DL — SIGNIFICANT CHANGE UP (ref 7–23)
CALCIUM SERPL-MCNC: 10 MG/DL — SIGNIFICANT CHANGE UP (ref 8.4–10.5)
CHLORIDE SERPL-SCNC: 103 MMOL/L — SIGNIFICANT CHANGE UP (ref 96–108)
CO2 SERPL-SCNC: 25 MMOL/L — SIGNIFICANT CHANGE UP (ref 22–31)
CREAT SERPL-MCNC: 0.87 MG/DL — SIGNIFICANT CHANGE UP (ref 0.5–1.3)
GLUCOSE SERPL-MCNC: 92 MG/DL — SIGNIFICANT CHANGE UP (ref 70–99)
HCT VFR BLD CALC: 44.5 % — SIGNIFICANT CHANGE UP (ref 34.5–45)
HGB BLD-MCNC: 14.9 G/DL — SIGNIFICANT CHANGE UP (ref 11.5–15.5)
MCHC RBC-ENTMCNC: 29.3 PG — SIGNIFICANT CHANGE UP (ref 27–34)
MCHC RBC-ENTMCNC: 33.5 GM/DL — SIGNIFICANT CHANGE UP (ref 32–36)
MCV RBC AUTO: 87.6 FL — SIGNIFICANT CHANGE UP (ref 80–100)
NRBC # BLD: 0 /100 WBCS — SIGNIFICANT CHANGE UP (ref 0–0)
PLATELET # BLD AUTO: 210 K/UL — SIGNIFICANT CHANGE UP (ref 150–400)
POTASSIUM SERPL-MCNC: 4.6 MMOL/L — SIGNIFICANT CHANGE UP (ref 3.5–5.3)
POTASSIUM SERPL-SCNC: 4.6 MMOL/L — SIGNIFICANT CHANGE UP (ref 3.5–5.3)
PROT SERPL-MCNC: 7.5 G/DL — SIGNIFICANT CHANGE UP (ref 6–8.3)
RBC # BLD: 5.08 M/UL — SIGNIFICANT CHANGE UP (ref 3.8–5.2)
RBC # FLD: 12.8 % — SIGNIFICANT CHANGE UP (ref 10.3–14.5)
SODIUM SERPL-SCNC: 140 MMOL/L — SIGNIFICANT CHANGE UP (ref 135–145)
WBC # BLD: 6.22 K/UL — SIGNIFICANT CHANGE UP (ref 3.8–10.5)
WBC # FLD AUTO: 6.22 K/UL — SIGNIFICANT CHANGE UP (ref 3.8–10.5)

## 2020-07-10 PROCEDURE — 85027 COMPLETE CBC AUTOMATED: CPT

## 2020-07-10 PROCEDURE — 99152 MOD SED SAME PHYS/QHP 5/>YRS: CPT

## 2020-07-10 PROCEDURE — 93458 L HRT ARTERY/VENTRICLE ANGIO: CPT | Mod: 26

## 2020-07-10 PROCEDURE — 93458 L HRT ARTERY/VENTRICLE ANGIO: CPT

## 2020-07-10 PROCEDURE — 93005 ELECTROCARDIOGRAM TRACING: CPT

## 2020-07-10 PROCEDURE — 80053 COMPREHEN METABOLIC PANEL: CPT

## 2020-07-10 PROCEDURE — C1894: CPT

## 2020-07-10 PROCEDURE — C1769: CPT

## 2020-07-10 PROCEDURE — 93010 ELECTROCARDIOGRAM REPORT: CPT

## 2020-07-10 PROCEDURE — C1887: CPT

## 2020-07-10 RX ORDER — SODIUM CHLORIDE 9 MG/ML
3 INJECTION INTRAMUSCULAR; INTRAVENOUS; SUBCUTANEOUS EVERY 8 HOURS
Refills: 0 | Status: DISCONTINUED | OUTPATIENT
Start: 2020-07-10 | End: 2020-07-25

## 2020-07-10 RX ORDER — AMLODIPINE BESYLATE 2.5 MG/1
1 TABLET ORAL
Qty: 0 | Refills: 0 | DISCHARGE

## 2020-07-10 RX ORDER — ASPIRIN/CALCIUM CARB/MAGNESIUM 324 MG
1 TABLET ORAL
Qty: 0 | Refills: 0 | DISCHARGE

## 2020-07-10 RX ORDER — METOPROLOL TARTRATE 50 MG
1 TABLET ORAL
Qty: 0 | Refills: 0 | DISCHARGE

## 2020-07-14 LAB — SARS-COV-2 N GENE NPH QL NAA+PROBE: NOT DETECTED

## 2020-07-20 ENCOUNTER — APPOINTMENT (OUTPATIENT)
Dept: CARDIOLOGY | Facility: CLINIC | Age: 70
End: 2020-07-20
Payer: MEDICARE

## 2020-07-20 VITALS
HEART RATE: 70 BPM | HEIGHT: 61 IN | DIASTOLIC BLOOD PRESSURE: 82 MMHG | RESPIRATION RATE: 15 BRPM | SYSTOLIC BLOOD PRESSURE: 127 MMHG | WEIGHT: 149 LBS | BODY MASS INDEX: 28.13 KG/M2

## 2020-07-20 PROCEDURE — 99213 OFFICE O/P EST LOW 20 MIN: CPT

## 2020-07-20 NOTE — DISCUSSION/SUMMARY
[FreeTextEntry1] : This is a 70-year-old female past medical history significant for "borderline hypertension", status post hysterectomy, who comes in for cardiac follow-up evaluation.  She denies any further chest pain, shortness of breath, dizziness or syncope.\par The patient underwent a cardiac catheterization July 13, 2020 which demonstrated no coronary artery disease.\par The patient is on Toprol-XL 50 mg daily with good blood pressure response.  She is no longer on amlodipine.\par Her lipid profile is at goal and she will continue to improve this profile with appropriate diet and exercise.\par PMH:\par The patient was complaining of chest pressure not associated any particular activity.  She went to [facility] where myocardial infarction was ruled out, July 3, 2020.  She was found to have hypertension and was started on Norvasc 2.5 mg/day.\par She was born in [facility] and has no history of rheumatic fever.  She does not drink excessive caffeine or alcohol.  She reports to having a normal endoscopy about 2 months ago.\par Her cardiac risk factors include hypertension.\par \par The patient had an abnormal exercise stress test July 2020.  The patient developed exertional chest pressure within 3-1/2 minutes of exercise.  The test was stopped secondary to chest pressure.  There were no ST–T wave changes.  The patient's blood pressure was stable\par The patient was started on Toprol-XL 50 mg in evening for cardiac protection, and treatment of her hypertension. \par The patient understands if she develops any further chest discomfort she must go to emergency room immediately.  She will undergo a cardiac catheterization this week.  Arrangements have been made at [facility].\par A detailed discussion of her symptoms, and cardiac risk profile, and results of exercise stress test took place today.  The patient has a good understanding and will wait for the procedure.\par She had an echo Doppler examination done July 3, 2020 at [facility] which demonstrated minimal mitral valve regurgitation, minimal pulmonic valve regurgitation, minimal tricuspid valve regurgitation, with hypokinesis of the basal inferior septal wall.\par \par The patient will follow-up with me after the catheterization is completed.\par She is currently hemodynamically stable from a cardiac standpoint.  She will  her Toprol-XL this morning.

## 2020-07-20 NOTE — PHYSICAL EXAM
[General Appearance - Well Developed] : well developed [Normal Appearance] : normal appearance [Well Groomed] : well groomed [General Appearance - Well Nourished] : well nourished [No Deformities] : no deformities [General Appearance - In No Acute Distress] : no acute distress [Normal Conjunctiva] : the conjunctiva exhibited no abnormalities [Normal Oral Mucosa] : normal oral mucosa [Normal Oropharynx] : normal oropharynx [Normal Jugular Venous A Waves Present] : normal jugular venous A waves present [Normal Jugular Venous V Waves Present] : normal jugular venous V waves present [Respiration, Rhythm And Depth] : normal respiratory rhythm and effort [No Jugular Venous Bruno A Waves] : no jugular venous bruno A waves [Exaggerated Use Of Accessory Muscles For Inspiration] : no accessory muscle use [Auscultation Breath Sounds / Voice Sounds] : lungs were clear to auscultation bilaterally [Bowel Sounds] : normal bowel sounds [Abdomen Soft] : soft [Nail Clubbing] : no clubbing of the fingernails [Abnormal Walk] : normal gait [Cyanosis, Localized] : no localized cyanosis [Skin Color & Pigmentation] : normal skin color and pigmentation [Skin Turgor] : normal skin turgor [] : no rash [Oriented To Time, Place, And Person] : oriented to person, place, and time [Mood] : the mood was normal [Affect] : the affect was normal [No Anxiety] : not feeling anxious [Normal] : normal [Normal Rate] : normal [Rhythm Regular] : regular [Normal S2] : normal S2 [Normal S1] : normal S1 [No Gallop] : no gallop heard [S3] : no S3 [S4] : no S4 [Click] : no click [Pericardial Rub] : no pericardial rub [II] : a grade 2 [I] : a grade 1 [Right Carotid Bruit] : no bruit heard over the right carotid [Left Carotid Bruit] : no bruit heard over the left carotid [Right Femoral Bruit] : no bruit heard over the right femoral artery [Left Femoral Bruit] : no bruit heard over the left femoral artery [2+] : right 2+ [No Abnormalities] : the abdominal aorta was not enlarged and no bruit was heard [No Pitting Edema] : no pitting edema present

## 2020-07-21 PROBLEM — J30.2 OTHER SEASONAL ALLERGIC RHINITIS: Chronic | Status: ACTIVE | Noted: 2020-07-03

## 2020-07-21 RX ORDER — ASPIRIN 81 MG/1
81 TABLET ORAL
Qty: 60 | Refills: 3 | Status: ACTIVE | COMMUNITY
Start: 2020-07-21

## 2020-08-26 NOTE — H&P ADULT - REASON FOR ADMISSION
Left voicemail for patient for results of wound culture. Per Dr. Kari Pennington it is a staph infection, prescriptions were sent over to pharmacy and pt needs to follow up with surgeon. dizziness and SOB

## 2020-09-29 ENCOUNTER — APPOINTMENT (OUTPATIENT)
Dept: CARDIOLOGY | Facility: CLINIC | Age: 70
End: 2020-09-29

## 2021-02-16 ENCOUNTER — RX RENEWAL (OUTPATIENT)
Age: 71
End: 2021-02-16

## 2021-02-22 ENCOUNTER — LABORATORY RESULT (OUTPATIENT)
Age: 71
End: 2021-02-22

## 2021-02-23 ENCOUNTER — APPOINTMENT (OUTPATIENT)
Dept: CARDIOLOGY | Facility: CLINIC | Age: 71
End: 2021-02-23
Payer: MEDICARE

## 2021-02-23 ENCOUNTER — NON-APPOINTMENT (OUTPATIENT)
Age: 71
End: 2021-02-23

## 2021-02-23 VITALS
WEIGHT: 152 LBS | RESPIRATION RATE: 16 BRPM | DIASTOLIC BLOOD PRESSURE: 88 MMHG | SYSTOLIC BLOOD PRESSURE: 138 MMHG | HEIGHT: 61 IN | HEART RATE: 72 BPM | BODY MASS INDEX: 28.7 KG/M2

## 2021-02-23 DIAGNOSIS — Z87.898 PERSONAL HISTORY OF OTHER SPECIFIED CONDITIONS: ICD-10-CM

## 2021-02-23 PROCEDURE — 99213 OFFICE O/P EST LOW 20 MIN: CPT

## 2021-02-23 PROCEDURE — 93000 ELECTROCARDIOGRAM COMPLETE: CPT

## 2021-02-26 ENCOUNTER — NON-APPOINTMENT (OUTPATIENT)
Age: 71
End: 2021-02-26

## 2021-07-12 NOTE — PROVIDER CONTACT NOTE (OTHER) - NAME OF MD/NP/PA/DO NOTIFIED:
Patient called stating she has further questions about tomorrow surgery. Please call patient back at [phone].   [id number] Reyes

## 2021-07-14 ENCOUNTER — NON-APPOINTMENT (OUTPATIENT)
Age: 71
End: 2021-07-14

## 2021-07-21 ENCOUNTER — APPOINTMENT (OUTPATIENT)
Dept: CARDIOLOGY | Facility: CLINIC | Age: 71
End: 2021-07-21
Payer: MEDICARE

## 2021-07-21 ENCOUNTER — NON-APPOINTMENT (OUTPATIENT)
Age: 71
End: 2021-07-21

## 2021-07-21 VITALS
WEIGHT: 144 LBS | DIASTOLIC BLOOD PRESSURE: 84 MMHG | HEIGHT: 61 IN | SYSTOLIC BLOOD PRESSURE: 144 MMHG | BODY MASS INDEX: 27.19 KG/M2 | HEART RATE: 65 BPM | RESPIRATION RATE: 16 BRPM | TEMPERATURE: 98 F | OXYGEN SATURATION: 98 %

## 2021-07-21 PROCEDURE — 99214 OFFICE O/P EST MOD 30 MIN: CPT

## 2021-07-21 PROCEDURE — 93000 ELECTROCARDIOGRAM COMPLETE: CPT

## 2021-07-21 RX ORDER — ROSUVASTATIN CALCIUM 20 MG/1
20 TABLET, FILM COATED ORAL
Qty: 90 | Refills: 1 | Status: ACTIVE | COMMUNITY
Start: 2021-03-01 | End: 1900-01-01

## 2021-07-21 NOTE — DISCUSSION/SUMMARY
[FreeTextEntry1] : This is a 71-year-old female past medical history significant for "borderline hypertension", status post hysterectomy, who comes in for cardiac follow-up evaluation.  She denies any further chest pain, shortness of breath, dizziness or syncope.\par She was born in [facility] and has no history of rheumatic fever.  She does not drink excessive caffeine or alcohol.  She reports to having a normal endoscopy about 2 months ago.\par Her cardiac risk factors include hypertension.\par EKG done July 21, 2021 demonstrate normal sinus rhythm rate 64 bpm otherwise remarkable for left atrial abnormality.\par The patient was recommended Crestor 20 mg/day for primary prevention given her elevated ASCVD risk score of approximately 11%.  Her blood pressure is also elevated today and has been elevated in the past.\par Given this patient's risk profile I have once again recommended she start on Crestor 20 mg daily, and I will also add Micardis 40 mg in the morning to control her blood pressure.  She will follow up with me in 1 month to reevaluate her lipids and reevaluate her blood pressure.\par A detailed discussion of cardio risk took place today.  She agrees to starting therapy.\par She had one isolated episode of a chest pressure sensation when sitting down to eating dinner approximately 1 week ago.  The episode sounded like dyspepsia.  I have asked to follow-up with a gastroenterologist.\par Cardiac catheterization July 13, 2020 which demonstrated no coronary artery disease.\par She had an echo Doppler examination done July 3, 2020 at [facility] which demonstrated minimal mitral valve regurgitation, minimal pulmonic valve regurgitation, minimal tricuspid valve regurgitation, with hypokinesis of the basal inferior septal wall.\par PMH:\par The patient was complaining of chest pressure not associated any particular activity.  She went to [facility] where myocardial infarction was ruled out, July 3, 2020.  She was found to have hypertension and was started on Norvasc 2.5 mg/day.\par The patient had an abnormal exercise stress test July 2020.  The patient developed exertional chest pressure within 3-1/2 minutes of exercise.  The test was stopped secondary to chest pressure.  There were no ST–T wave changes.  The patient's blood pressure was stable\par She will continue Toprol-XL 50 mg in the evening. \par The patient understands that aerobic exercises must be increased to 40 minutes 4 times per week. A detailed discussion of lifestyle modification was done today. The patient has a good understanding of the diagnosis, and treatment plan. Lifestyle modification was also outlined.\par \par

## 2021-07-21 NOTE — PHYSICAL EXAM
[General Appearance - Well Developed] : well developed [Normal Appearance] : normal appearance [Well Groomed] : well groomed [General Appearance - Well Nourished] : well nourished [No Deformities] : no deformities [General Appearance - In No Acute Distress] : no acute distress [Normal Oral Mucosa] : normal oral mucosa [Normal Oropharynx] : normal oropharynx [Normal Jugular Venous A Waves Present] : normal jugular venous A waves present [Normal Jugular Venous V Waves Present] : normal jugular venous V waves present [No Jugular Venous Bruno A Waves] : no jugular venous bruno A waves [Respiration, Rhythm And Depth] : normal respiratory rhythm and effort [Exaggerated Use Of Accessory Muscles For Inspiration] : no accessory muscle use [Auscultation Breath Sounds / Voice Sounds] : lungs were clear to auscultation bilaterally [Bowel Sounds] : normal bowel sounds [Abdomen Soft] : soft [Abnormal Walk] : normal gait [Nail Clubbing] : no clubbing of the fingernails [Cyanosis, Localized] : no localized cyanosis [Skin Color & Pigmentation] : normal skin color and pigmentation [Skin Turgor] : normal skin turgor [] : no rash [Oriented To Time, Place, And Person] : oriented to person, place, and time [Affect] : the affect was normal [No Anxiety] : not feeling anxious [Mood] : the mood was normal [Normal] : normal [Normal Rate] : normal [Rhythm Regular] : regular [Normal S1] : normal S1 [Normal S2] : normal S2 [II] : a grade 2 [I] : a grade 1 [2+] : left 2+ [No Abnormalities] : the abdominal aorta was not enlarged and no bruit was heard [No Pitting Edema] : no pitting edema present [Well Developed] : well developed [Well Nourished] : well nourished [No Acute Distress] : no acute distress [Normal Conjunctiva] : normal conjunctiva [Normal Venous Pressure] : normal venous pressure [No Carotid Bruit] : no carotid bruit [Normal S1, S2] : normal S1, S2 [No Murmur] : no murmur [No Rub] : no rub [No Gallop] : no gallop [Murmur] : murmur [Clear Lung Fields] : clear lung fields [Good Air Entry] : good air entry [No Respiratory Distress] : no respiratory distress  [Soft] : abdomen soft [Non Tender] : non-tender [No Masses/organomegaly] : no masses/organomegaly [Normal Bowel Sounds] : normal bowel sounds [Normal Gait] : normal gait [No Edema] : no edema [No Cyanosis] : no cyanosis [No Clubbing] : no clubbing [No Varicosities] : no varicosities [No Rash] : no rash [No Skin Lesions] : no skin lesions [Moves all extremities] : moves all extremities [No Focal Deficits] : no focal deficits [Normal Speech] : normal speech [Alert and Oriented] : alert and oriented [Normal memory] : normal memory [de-identified] : 1/6 systolic murmur [S3] : no S3 [S4] : no S4 [Click] : no click [Pericardial Rub] : no pericardial rub [Right Carotid Bruit] : no bruit heard over the right carotid [Left Carotid Bruit] : no bruit heard over the left carotid [Right Femoral Bruit] : no bruit heard over the right femoral artery [Left Femoral Bruit] : no bruit heard over the left femoral artery

## 2021-07-21 NOTE — REASON FOR VISIT
[CV Risk Factors and Non-Cardiac Disease] : CV risk factors and non-cardiac disease [Follow-Up - Clinic] : a clinic follow-up of [Chest Pain] : chest pain [Hyperlipidemia] : hyperlipidemia [Hypertension] : hypertension [FreeTextEntry1] : murmur

## 2021-08-23 ENCOUNTER — LABORATORY RESULT (OUTPATIENT)
Age: 71
End: 2021-08-23

## 2021-08-24 ENCOUNTER — APPOINTMENT (OUTPATIENT)
Dept: CARDIOLOGY | Facility: CLINIC | Age: 71
End: 2021-08-24
Payer: MEDICARE

## 2021-08-24 VITALS
OXYGEN SATURATION: 97 % | TEMPERATURE: 97.4 F | HEIGHT: 61 IN | HEART RATE: 71 BPM | BODY MASS INDEX: 27.56 KG/M2 | DIASTOLIC BLOOD PRESSURE: 88 MMHG | WEIGHT: 146 LBS | SYSTOLIC BLOOD PRESSURE: 148 MMHG | RESPIRATION RATE: 16 BRPM

## 2021-08-24 DIAGNOSIS — I10 ESSENTIAL (PRIMARY) HYPERTENSION: ICD-10-CM

## 2021-08-24 PROCEDURE — 93784 AMBL BP MNTR W/SOFTWARE: CPT

## 2021-08-24 PROCEDURE — 99214 OFFICE O/P EST MOD 30 MIN: CPT | Mod: 25

## 2021-08-24 NOTE — DISCUSSION/SUMMARY
[FreeTextEntry1] : Dr. Lam-(PRIOR VISIT and PMH WITH Dr. Lam): \par This is a 71-year-old female past medical history significant for "borderline hypertension", status post hysterectomy, who comes in for cardiac follow-up evaluation.  She denies any further chest pain, shortness of breath, dizziness or syncope.\par \par She was born in [facility] and has no history of rheumatic fever.  She does not drink excessive caffeine or alcohol.  She reports to having a normal endoscopy about 2 months ago.\par \par Her cardiac risk factors include hypertension.\par \par EKG done July 21, 2021 demonstrate normal sinus rhythm rate 64 bpm otherwise remarkable for left atrial abnormality.\par \par The patient was recommended Crestor 20 mg/day for primary prevention given her elevated ASCVD risk score of approximately 11%.  Her blood pressure is also elevated today and has been elevated in the past.\par \par Given this patient's risk profile I have once again recommended she start on Crestor 20 mg daily, and I will also add Micardis 40 mg in the morning to control her blood pressure.  She will follow up with me in 1 month to reevaluate her lipids and reevaluate her blood pressure.\par \par A detailed discussion of cardio risk took place today.  She agrees to starting therapy.\par She had one isolated episode of a chest pressure sensation when sitting down to eating dinner approximately 1 week ago.  The episode sounded like dyspepsia.  I have asked to follow-up with a gastroenterologist.\par \par Cardiac catheterization July 13, 2020 which demonstrated no coronary artery disease.\par \par She had an echo Doppler examination done July 3, 2020 at [facility] which demonstrated minimal mitral valve regurgitation, minimal pulmonic valve regurgitation, minimal tricuspid valve regurgitation, with hypokinesis of the basal inferior septal wall.\par \par PMH:\par The patient was complaining of chest pressure not associated any particular activity.  She went to [facility] where myocardial infarction was ruled out, July 3, 2020.  She was found to have hypertension and was started on Norvasc 2.5 mg/day.\par \par The patient had an abnormal exercise stress test July 2020.  The patient developed exertional chest pressure within 3-1/2 minutes of exercise.  The test was stopped secondary to chest pressure.  There were no ST–T wave changes.  The patient's blood pressure was stable\par She will continue Toprol-XL 50 mg in the evening. \par \par The patient understands that aerobic exercises must be increased to 40 minutes 4 times per week. A detailed discussion of lifestyle modification was done today. The patient has a good understanding of the diagnosis, and treatment plan. Lifestyle modification was also outlined.\par \par

## 2021-08-24 NOTE — ASSESSMENT
[FreeTextEntry1] : This is a 71 year year old female here today for follow up cardiac evaluation. \par She has a past medical history significant for "borderline hypertension", status post hysterectomy.\par \par CHIEF COMPLAINT:\par Today she is feeling generally well but would like to make a note that she feels her BP has been fluctuating while at home. She states that her BP can be "really high and really low while at home".\par \par -She would like to make a note that she is going to be moving to [place] in the next few months.\par \par -Her cardiac risk factors include hypertension.\par She was born in [facility] and has no history of rheumatic fever.  She does not drink excessive caffeine or alcohol.\par \par BLOOD PRESSURE:\par -BP is elevated on today's exam and she is currently on Metoprolol ER 50 PO DAILY and Telmisartan 40mg PO DAILY. She states that she is nervous to increase her Telmisartan dosage because her BP may get "too low" and she may feel dizzy. \par \par -We will order a 24 hour BP cuff to evaluate her home BP readings for white coat HTN.\par \par -I have discussed the importance of maintaining good BP control and reviewed the newest guidelines with the patient while re-enforcing dietary sodium restrictions to no more than 2-3 g daily, DASH diet, life style modifications as well as the goal of maintaining ideal body weight with the patient today. I have advised the patient to avoid the use of over-the-counter medications/ supplements especially NSAIDS.\par \par I have reviewed with Ms. QUINTANILLA that serious health consequences can occur when blood pressure is not well controlled and the need for strict compliance with medication and that optimal control can significantly reduce the risk of cardiovascular disease stroke, heart attack and other organ damage. They verbally expressed understanding of the fore mentioned serious health consequences to me today.\par \par BLOOD WORK:\par -New blood work was done today, 08/24/2021 to evaluate lipid profile, CBC, BMP, hepatic function, A1C and TSH. Pt currently taking Rosuvastatin 20mg PO DAILY.\par \par CHOLESTEROL CONTROL:\par -Patient will continue the advised  TLC diet and to continue follow-up for treatment of hyperlipidemia and repeat blood testing with diet and exercise. I have discussed different exercises and the importance of maintenance of optimal body weight. The importance of staying within guidelines and recommendations was stressed to the patient today and they acknowledged that they understand this to me verbally.\par \par  -Ms. QUINTANILLA was educated and advised that failure to follow-up with my medical recommendations to lower cholesterol can result in severe health consequences therefore, they will continuing a low saturated and low fat diet and to avoid excessive carbohydrates to help reduce triglycerides and that lowering LDL levels is associated with a significant decrease in serious cardiac events including but not limited to heart attack stroke and overall death. We will continue lipid lowering agents as advised based on blood test results and the patient understands to call if they develop severe muscle discomfort or if they have a reddish tinted discoloration in their urine.\par \par TESTING/REPORTS:\par -EKG done July 21, 2021 demonstrate normal sinus rhythm rate 64 bpm otherwise remarkable for left atrial abnormality.\par \par -Cardiac catheterization July 13, 2020 which demonstrated no coronary artery disease.\par \par -She had an echo Doppler examination done July 3, 2020 at [facility] which demonstrated minimal mitral valve regurgitation, minimal pulmonic valve regurgitation, minimal tricuspid valve regurgitation, with hypokinesis of the basal inferior septal wall.\par \par -PMH:\par The patient was complaining of chest pressure not associated any particular activity.  She went to [facility] where myocardial infarction was ruled out, July 3, 2020.  She was found to have hypertension and was started on Norvasc 2.5 mg/day.\par \par -The patient had an abnormal exercise stress test July 2020.  The patient developed exertional chest pressure within 3-1/2 minutes of exercise.  The test was stopped secondary to chest pressure.  There were no ST–T wave changes.  The patient's blood pressure was stable\par \par PLAN:\par -24 hour BP cuff\par -The patient will schedule an Echo Doppler examination to evaluate murmur, left ventricular function, chamber size, and rule out hypertrophy.\par -I also ask that she follow up with her PCP.\par -Follow up in 1 month.\par \par I have discussed the plan of care with Ms. CLIFTON QUINTANILLA . She is compliant with all of her medications.\par \par The patient understands that aerobic exercises must be increased to minutes 4 times/week and a detailed discussion of lifestyle modification was done today. \par The patient has a good understanding of the diagnosis, treatment plan and lifestyle modification. \par She will contact me at the office for any questions with their care or any changes in their health status.\par \par The patient was seen together with supervision physician Dr. Deshaun Lam and the plan of care was discussed with the patient and will be carried out as noted above.\par \par Omar ROACH

## 2021-08-24 NOTE — PHYSICAL EXAM
[Well Developed] : well developed [Well Nourished] : well nourished [No Acute Distress] : no acute distress [Normal Venous Pressure] : normal venous pressure [No Carotid Bruit] : no carotid bruit [Normal S1, S2] : normal S1, S2 [No Murmur] : no murmur [No Rub] : no rub [Murmur] : murmur [Clear Lung Fields] : clear lung fields [Good Air Entry] : good air entry [No Respiratory Distress] : no respiratory distress  [Soft] : abdomen soft [Non Tender] : non-tender [No Masses/organomegaly] : no masses/organomegaly [Normal Bowel Sounds] : normal bowel sounds [Normal Gait] : normal gait [No Edema] : no edema [No Cyanosis] : no cyanosis [No Clubbing] : no clubbing [No Varicosities] : no varicosities [No Rash] : no rash [No Skin Lesions] : no skin lesions [Moves all extremities] : moves all extremities [No Focal Deficits] : no focal deficits [Normal Speech] : normal speech [Alert and Oriented] : alert and oriented [Normal memory] : normal memory [General Appearance - Well Developed] : well developed [Normal Appearance] : normal appearance [Well Groomed] : well groomed [General Appearance - Well Nourished] : well nourished [No Deformities] : no deformities [General Appearance - In No Acute Distress] : no acute distress [Normal Conjunctiva] : the conjunctiva exhibited no abnormalities [Normal Oral Mucosa] : normal oral mucosa [Normal Oropharynx] : normal oropharynx [Normal Jugular Venous A Waves Present] : normal jugular venous A waves present [Normal Jugular Venous V Waves Present] : normal jugular venous V waves present [No Jugular Venous Bruno A Waves] : no jugular venous bruno A waves [Respiration, Rhythm And Depth] : normal respiratory rhythm and effort [Exaggerated Use Of Accessory Muscles For Inspiration] : no accessory muscle use [Auscultation Breath Sounds / Voice Sounds] : lungs were clear to auscultation bilaterally [Bowel Sounds] : normal bowel sounds [Abdomen Soft] : soft [Abnormal Walk] : normal gait [Nail Clubbing] : no clubbing of the fingernails [Cyanosis, Localized] : no localized cyanosis [Skin Color & Pigmentation] : normal skin color and pigmentation [Skin Turgor] : normal skin turgor [] : no rash [Oriented To Time, Place, And Person] : oriented to person, place, and time [Affect] : the affect was normal [Mood] : the mood was normal [No Anxiety] : not feeling anxious [Normal] : normal [Normal Rate] : normal [Normal S1] : normal S1 [Rhythm Regular] : regular [Normal S2] : normal S2 [No Gallop] : no gallop heard [II] : a grade 2 [I] : a grade 1 [2+] : left 2+ [No Abnormalities] : the abdominal aorta was not enlarged and no bruit was heard [No Pitting Edema] : no pitting edema present [de-identified] : 1/6 systolic murmur [S3] : no S3 [S4] : no S4 [Click] : no click [Pericardial Rub] : no pericardial rub [Right Carotid Bruit] : no bruit heard over the right carotid [Left Carotid Bruit] : no bruit heard over the left carotid [Right Femoral Bruit] : no bruit heard over the right femoral artery [Left Femoral Bruit] : no bruit heard over the left femoral artery

## 2021-08-25 PROBLEM — I10 WHITE COAT SYNDROME WITH DIAGNOSIS OF HYPERTENSION: Status: ACTIVE | Noted: 2021-08-24

## 2021-09-27 ENCOUNTER — APPOINTMENT (OUTPATIENT)
Dept: CARDIOLOGY | Facility: CLINIC | Age: 71
End: 2021-09-27
Payer: MEDICARE

## 2021-09-27 ENCOUNTER — NON-APPOINTMENT (OUTPATIENT)
Age: 71
End: 2021-09-27

## 2021-09-27 VITALS
BODY MASS INDEX: 27 KG/M2 | WEIGHT: 143 LBS | RESPIRATION RATE: 15 BRPM | HEIGHT: 61 IN | TEMPERATURE: 97.9 F | OXYGEN SATURATION: 95 % | SYSTOLIC BLOOD PRESSURE: 128 MMHG | DIASTOLIC BLOOD PRESSURE: 84 MMHG | HEART RATE: 66 BPM

## 2021-09-27 DIAGNOSIS — I10 ESSENTIAL (PRIMARY) HYPERTENSION: ICD-10-CM

## 2021-09-27 DIAGNOSIS — R01.1 CARDIAC MURMUR, UNSPECIFIED: ICD-10-CM

## 2021-09-27 DIAGNOSIS — E78.00 PURE HYPERCHOLESTEROLEMIA, UNSPECIFIED: ICD-10-CM

## 2021-09-27 PROCEDURE — 93306 TTE W/DOPPLER COMPLETE: CPT

## 2021-09-27 PROCEDURE — 99214 OFFICE O/P EST MOD 30 MIN: CPT | Mod: 25

## 2021-09-27 NOTE — DISCUSSION/SUMMARY
[FreeTextEntry1] : Dr. Lam-(PRIOR VISIT and PMH WITH Dr. Lam): \par This is a 71-year-old female past medical history significant for "borderline hypertension", status post hysterectomy, who comes in for cardiac follow-up evaluation.  She denies any further chest pain, shortness of breath, dizziness or syncope.\par \par She was born in [facility] and has no history of rheumatic fever.  She does not drink excessive caffeine or alcohol.  She reports to having a normal endoscopy about 2 months ago.\par \par Her cardiac risk factors include hypertension.\par \par EKG done July 21, 2021 demonstrate normal sinus rhythm rate 64 bpm otherwise remarkable for left atrial abnormality.\par \par The patient was recommended Crestor 20 mg/day for primary prevention given her elevated ASCVD risk score of approximately 11%.  Her blood pressure is also elevated today and has been elevated in the past.\par \par Given this patient's risk profile I have once again recommended she start on Crestor 20 mg daily, and I will also add Micardis 40 mg in the morning to control her blood pressure.  She will follow up with me in 1 month to reevaluate her lipids and reevaluate her blood pressure.\par \par A detailed discussion of cardio risk took place today.  She agrees to starting therapy.\par She had one isolated episode of a chest pressure sensation when sitting down to eating dinner approximately 1 week ago.  The episode sounded like dyspepsia.  I have asked to follow-up with a gastroenterologist.\par \par Cardiac catheterization July 13, 2020 which demonstrated no coronary artery disease.\par \par She had an echo Doppler examination done July 3, 2020 at [facility] which demonstrated minimal mitral valve regurgitation, minimal pulmonic valve regurgitation, minimal tricuspid valve regurgitation, with hypokinesis of the basal inferior septal wall.\par \par PMH:\par The patient was complaining of chest pressure not associated any particular activity.  She went to [facility] where myocardial infarction was ruled out, July 3, 2020.  She was found to have hypertension and was started on Norvasc 2.5 mg/day.\par \par The patient had an abnormal exercise stress test July 2020.  The patient developed exertional chest pressure within 3-1/2 minutes of exercise.  The test was stopped secondary to chest pressure.  There were no ST–T wave changes.  The patient's blood pressure was stable\par She will continue Toprol-XL 50 mg in the evening. \par \par The patient understands that aerobic exercises must be increased to 40 minutes 4 times per week. A detailed discussion of lifestyle modification was done today. The patient has a good understanding of the diagnosis, and treatment plan. Lifestyle modification was also outlined.\par \par

## 2021-09-27 NOTE — PHYSICAL EXAM
[Well Developed] : well developed [Well Nourished] : well nourished [No Acute Distress] : no acute distress [Normal Venous Pressure] : normal venous pressure [No Carotid Bruit] : no carotid bruit [Normal S1, S2] : normal S1, S2 [No Murmur] : no murmur [No Rub] : no rub [Murmur] : murmur [Clear Lung Fields] : clear lung fields [Good Air Entry] : good air entry [No Respiratory Distress] : no respiratory distress  [Soft] : abdomen soft [Non Tender] : non-tender [No Masses/organomegaly] : no masses/organomegaly [Normal Bowel Sounds] : normal bowel sounds [Normal Gait] : normal gait [No Edema] : no edema [No Cyanosis] : no cyanosis [No Clubbing] : no clubbing [No Varicosities] : no varicosities [No Rash] : no rash [No Skin Lesions] : no skin lesions [Moves all extremities] : moves all extremities [No Focal Deficits] : no focal deficits [Normal Speech] : normal speech [Alert and Oriented] : alert and oriented [Normal memory] : normal memory [Normal Appearance] : normal appearance [General Appearance - Well Developed] : well developed [Well Groomed] : well groomed [No Deformities] : no deformities [General Appearance - Well Nourished] : well nourished [General Appearance - In No Acute Distress] : no acute distress [Normal Conjunctiva] : the conjunctiva exhibited no abnormalities [Normal Oral Mucosa] : normal oral mucosa [Normal Oropharynx] : normal oropharynx [Normal Jugular Venous A Waves Present] : normal jugular venous A waves present [Normal Jugular Venous V Waves Present] : normal jugular venous V waves present [No Jugular Venous Bruno A Waves] : no jugular venous bruno A waves [Respiration, Rhythm And Depth] : normal respiratory rhythm and effort [Exaggerated Use Of Accessory Muscles For Inspiration] : no accessory muscle use [Auscultation Breath Sounds / Voice Sounds] : lungs were clear to auscultation bilaterally [Bowel Sounds] : normal bowel sounds [Abdomen Soft] : soft [Abnormal Walk] : normal gait [Nail Clubbing] : no clubbing of the fingernails [Cyanosis, Localized] : no localized cyanosis [Skin Color & Pigmentation] : normal skin color and pigmentation [Skin Turgor] : normal skin turgor [] : no rash [Oriented To Time, Place, And Person] : oriented to person, place, and time [Affect] : the affect was normal [Mood] : the mood was normal [No Anxiety] : not feeling anxious [Normal] : normal [Normal Rate] : normal [Rhythm Regular] : regular [Normal S1] : normal S1 [Normal S2] : normal S2 [No Gallop] : no gallop heard [II] : a grade 2 [I] : a grade 1 [2+] : left 2+ [No Abnormalities] : the abdominal aorta was not enlarged and no bruit was heard [No Pitting Edema] : no pitting edema present [de-identified] : 1/6 systolic murmur [S3] : no S3 [S4] : no S4 [Click] : no click [Pericardial Rub] : no pericardial rub [Right Carotid Bruit] : no bruit heard over the right carotid [Left Carotid Bruit] : no bruit heard over the left carotid [Right Femoral Bruit] : no bruit heard over the right femoral artery [Left Femoral Bruit] : no bruit heard over the left femoral artery

## 2021-09-27 NOTE — ASSESSMENT
[FreeTextEntry1] : This is a 71 year year old female here today for follow up cardiac evaluation. \par She has a past medical history significant for "borderline hypertension", status post hysterectomy.\par \par CHIEF COMPLAINT:\par Today she is feeling generally well and is here for BP check. She had a 24 hour BP cuff placed on 8/24/2021 which demonstrated elevated readings however she did not wish to increase her medication during the time when she had her results. She states that when she has elevated stress at home she notices it is higher than usual. She has been limiting her salt and caffeine intake and lost about 3lbs since her last visit. \par \par -She would like to make a note that she is going to be moving to [place] in the next few months.\par \par -Her cardiac risk factors include hypertension.\par She was born in [facility] and has no history of rheumatic fever.  She does not drink excessive caffeine or alcohol.\par \par BLOOD PRESSURE:\par -BP is better controlled on today's exam and she is on Metoprolol ER 50 PO DAILY and Telmisartan 40mg PO DAILY. \par \par -I have discussed the importance of maintaining good BP control and reviewed the newest guidelines with the patient while re-enforcing dietary sodium restrictions to no more than 2-3 g daily, DASH diet, life style modifications as well as the goal of maintaining ideal body weight with the patient today. I have advised the patient to avoid the use of over-the-counter medications/ supplements especially NSAIDS.\par \par I have reviewed with Ms. QUINTANILLA that serious health consequences can occur when blood pressure is not well controlled and the need for strict compliance with medication and that optimal control can significantly reduce the risk of cardiovascular disease stroke, heart attack and other organ damage. They verbally expressed understanding of the fore mentioned serious health consequences to me today.\par \par BLOOD WORK:\par -New blood work was done 8/24/2020 which demonstrated normal lipid profile and pt currently taking Rosuvastatin 20mg PO DAILY.\par \par CHOLESTEROL CONTROL:\par -Patient will continue the advised  TLC diet and to continue follow-up for treatment of hyperlipidemia and repeat blood testing with diet and exercise. I have discussed different exercises and the importance of maintenance of optimal body weight. The importance of staying within guidelines and recommendations was stressed to the patient today and they acknowledged that they understand this to me verbally.\par \par  -Ms. QUINTANILLA was educated and advised that failure to follow-up with my medical recommendations to lower cholesterol can result in severe health consequences therefore, they will continuing a low saturated and low fat diet and to avoid excessive carbohydrates to help reduce triglycerides and that lowering LDL levels is associated with a significant decrease in serious cardiac events including but not limited to heart attack stroke and overall death. We will continue lipid lowering agents as advised based on blood test results and the patient understands to call if they develop severe muscle discomfort or if they have a reddish tinted discoloration in their urine.\par \par TESTING/REPORTS:\par -EKG done July 21, 2021 demonstrate normal sinus rhythm rate 64 bpm otherwise remarkable for left atrial abnormality.\par \par -Cardiac catheterization July 13, 2020 which demonstrated no coronary artery disease.\par \par -She had an echo Doppler examination done July 3, 2020 at [facility] which demonstrated minimal mitral valve regurgitation, minimal pulmonic valve regurgitation, minimal tricuspid valve regurgitation, with hypokinesis of the basal inferior septal wall.\par \par -PMH:\par The patient was complaining of chest pressure not associated any particular activity.  She went to [facility] where myocardial infarction was ruled out, July 3, 2020.  She was found to have hypertension and was started on Norvasc 2.5 mg/day.\par \par -The patient had an abnormal exercise stress test July 2020.  The patient developed exertional chest pressure within 3-1/2 minutes of exercise.  The test was stopped secondary to chest pressure.  There were no ST–T wave changes.  The patient's blood pressure was stable\par \par -Echocardiogram done in the office 9/27/2021 and results are pending. \par \par -24 hour BP cuff done on 8/24/2021 demonstrated stage 1 HTN.\par \par PLAN:\par -She will continue with her usual medications and will contact the office if she is having any complaints between now and their next follow up appointment.\par -Since she is moving she will contact our office if anything is needed for her new cardiologist and if she ever comes back to [place] she knows she can make an appointment with our office for future follow up appointments.\par \par I have discussed the plan of care with Ms. CLIFTON QUINTANILLA . She is compliant with all of her medications.\par \par The patient understands that aerobic exercises must be increased to minutes 4 times/week and a detailed discussion of lifestyle modification was done today. \par The patient has a good understanding of the diagnosis, treatment plan and lifestyle modification. \par She will contact me at the office for any questions with their care or any changes in their health status.\par \par The patient was seen together with supervision physician Dr. Deshaun Lam and the plan of care was discussed with the patient and will be carried out as noted above.\par \par Omar ROACH

## 2021-10-23 PROBLEM — R01.1 MURMUR: Status: ACTIVE | Noted: 2020-07-08

## 2022-01-31 RX ORDER — TELMISARTAN 40 MG/1
40 TABLET ORAL DAILY
Qty: 90 | Refills: 0 | Status: ACTIVE | COMMUNITY
Start: 2021-07-21 | End: 1900-01-01

## 2022-03-03 RX ORDER — METOPROLOL SUCCINATE 50 MG/1
50 TABLET, EXTENDED RELEASE ORAL
Qty: 30 | Refills: 0 | Status: ACTIVE | COMMUNITY
Start: 2020-07-08 | End: 1900-01-01

## 2022-03-09 ENCOUNTER — RX RENEWAL (OUTPATIENT)
Age: 72
End: 2022-03-09

## 2022-05-03 ENCOUNTER — RX RENEWAL (OUTPATIENT)
Age: 72
End: 2022-05-03

## 2024-07-16 NOTE — ED PROVIDER NOTE - MUSCULOSKELETAL, MLM
LEFT V/M NOTIFYING PT WE WILL CANCEL BONE SCAN PER DR HARVEY       Spine appears normal, range of motion is not limited, no muscle or joint tenderness

## 2025-05-21 NOTE — PROVIDER CONTACT NOTE (OTHER) - ACTION/TREATMENT ORDERED:
"Subjective   Patient ID: Kay Pike is a 60 y.o. female who presents for Follow-up.    Presents for c/o leg pains increased recently - believes to have cellulitis in both legs. Does have chronic lymphedema. Would benefit from lymphedema boots but was unable to afford due to deductible.   +chills occ fever.   Due for labs and A1C. Was unable to tolerate Mounjaro due to constipation.   On doxycycline chronically for psoriasis and recurrent cellulitis.          Review of Systems   All other systems reviewed and are negative.      Objective   /78   Pulse 82   Ht 1.575 m (5' 2\")   Wt 116 kg (256 lb)   SpO2 99%   BMI 46.82 kg/m²     Physical Exam  Constitutional:       Appearance: Normal appearance. She is obese.   HENT:      Mouth/Throat:      Pharynx: Oropharynx is clear.   Eyes:      Pupils: Pupils are equal, round, and reactive to light.   Cardiovascular:      Rate and Rhythm: Normal rate and regular rhythm.   Pulmonary:      Breath sounds: Wheezing present.   Musculoskeletal:      Right lower leg: Edema present.      Left lower leg: Edema present.   Skin:     Comments: Blisters, erythema and seeping   Neurological:      Mental Status: She is alert.         Assessment/Plan   Diagnoses and all orders for this visit:  Type 2 diabetes mellitus without complication, without long-term current use of insulin  -     POCT glycosylated hemoglobin (Hb A1C) manually resulted  Bilateral leg edema  -     furosemide (Lasix) 40 mg tablet; Take 2-3 tablets ( mg) by mouth once daily.  Cellulitis of right lower extremity  -     cefuroxime (Ceftin) 500 mg tablet; Take 1 tablet (500 mg) by mouth 2 times a day for 14 days.  Uncomplicated asthma, unspecified asthma severity, unspecified whether persistent ([facility])  Mixed simple and mucopurulent chronic bronchitis ([facility])    Follow up in 6 weeks   Encouraged smoking cessation     "
PA aware, continue to monitor.
PA will notify attending